# Patient Record
Sex: MALE | Race: WHITE | NOT HISPANIC OR LATINO | Employment: UNEMPLOYED | URBAN - METROPOLITAN AREA
[De-identification: names, ages, dates, MRNs, and addresses within clinical notes are randomized per-mention and may not be internally consistent; named-entity substitution may affect disease eponyms.]

---

## 2017-03-10 ENCOUNTER — HISTORICAL (OUTPATIENT)
Dept: LAB | Facility: HOSPITAL | Age: 22
End: 2017-03-10

## 2017-03-14 ENCOUNTER — HOSPITAL ENCOUNTER (EMERGENCY)
Facility: HOSPITAL | Age: 22
Discharge: HOME | End: 2017-03-14
Attending: EMERGENCY MEDICINE
Payer: MEDICAID

## 2017-03-14 VITALS
WEIGHT: 160 LBS | SYSTOLIC BLOOD PRESSURE: 119 MMHG | OXYGEN SATURATION: 95 % | DIASTOLIC BLOOD PRESSURE: 62 MMHG | HEIGHT: 62 IN | RESPIRATION RATE: 20 BRPM | TEMPERATURE: 98 F | HEART RATE: 82 BPM | BODY MASS INDEX: 29.44 KG/M2

## 2017-03-14 DIAGNOSIS — F19.10 SUBSTANCE ABUSE: Primary | ICD-10-CM

## 2017-03-14 LAB
ALBUMIN SERPL BCP-MCNC: 4 G/DL
ALP SERPL-CCNC: 56 U/L
ALT SERPL W/O P-5'-P-CCNC: 9 U/L
AMPHET+METHAMPHET UR QL: NEGATIVE
ANION GAP SERPL CALC-SCNC: 9 MMOL/L
APAP SERPL-MCNC: 3 UG/ML
AST SERPL-CCNC: 17 U/L
BARBITURATES UR QL SCN>200 NG/ML: NEGATIVE
BASOPHILS # BLD AUTO: 0.01 K/UL
BASOPHILS NFR BLD: 0.2 %
BENZODIAZ UR QL SCN>200 NG/ML: NORMAL
BILIRUB SERPL-MCNC: 0.3 MG/DL
BILIRUB UR QL STRIP: NEGATIVE
BUN SERPL-MCNC: 10 MG/DL
BZE UR QL SCN: NEGATIVE
CALCIUM SERPL-MCNC: 9.2 MG/DL
CANNABINOIDS UR QL SCN: NORMAL
CHLORIDE SERPL-SCNC: 105 MMOL/L
CLARITY UR REFRACT.AUTO: CLEAR
CO2 SERPL-SCNC: 27 MMOL/L
COLOR UR AUTO: YELLOW
CREAT SERPL-MCNC: 1 MG/DL
CREAT UR-MCNC: 125 MG/DL
DIFFERENTIAL METHOD: ABNORMAL
EOSINOPHIL # BLD AUTO: 0.1 K/UL
EOSINOPHIL NFR BLD: 1.5 %
ERYTHROCYTE [DISTWIDTH] IN BLOOD BY AUTOMATED COUNT: 14.2 %
EST. GFR  (AFRICAN AMERICAN): >60 ML/MIN/1.73 M^2
EST. GFR  (NON AFRICAN AMERICAN): >60 ML/MIN/1.73 M^2
ETHANOL SERPL-MCNC: <10 MG/DL
GLUCOSE SERPL-MCNC: 99 MG/DL
GLUCOSE UR QL STRIP: NEGATIVE
HCT VFR BLD AUTO: 38.9 %
HGB BLD-MCNC: 13.1 G/DL
HGB UR QL STRIP: NEGATIVE
KETONES UR QL STRIP: NEGATIVE
LEUKOCYTE ESTERASE UR QL STRIP: NEGATIVE
LYMPHOCYTES # BLD AUTO: 2.2 K/UL
LYMPHOCYTES NFR BLD: 38.2 %
MCH RBC QN AUTO: 32 PG
MCHC RBC AUTO-ENTMCNC: 33.7 %
MCV RBC AUTO: 95 FL
METHADONE UR QL SCN>300 NG/ML: NEGATIVE
MONOCYTES # BLD AUTO: 0.4 K/UL
MONOCYTES NFR BLD: 6.8 %
NEUTROPHILS # BLD AUTO: 3.1 K/UL
NEUTROPHILS NFR BLD: 53.1 %
NITRITE UR QL STRIP: NEGATIVE
OPIATES UR QL SCN: NORMAL
PCP UR QL SCN>25 NG/ML: NEGATIVE
PH UR STRIP: 5 [PH] (ref 5–8)
PLATELET # BLD AUTO: 168 K/UL
PMV BLD AUTO: 10.9 FL
POTASSIUM SERPL-SCNC: 4 MMOL/L
PROT SERPL-MCNC: 6.4 G/DL
PROT UR QL STRIP: NEGATIVE
RBC # BLD AUTO: 4.09 M/UL
SODIUM SERPL-SCNC: 141 MMOL/L
SP GR UR STRIP: 1.01 (ref 1–1.03)
TOXICOLOGY INFORMATION: NORMAL
TSH SERPL DL<=0.005 MIU/L-ACNC: 0.65 UIU/ML
URN SPEC COLLECT METH UR: NORMAL
UROBILINOGEN UR STRIP-ACNC: NEGATIVE EU/DL
WBC # BLD AUTO: 5.86 K/UL

## 2017-03-14 PROCEDURE — 93005 ELECTROCARDIOGRAM TRACING: CPT

## 2017-03-14 PROCEDURE — 80329 ANALGESICS NON-OPIOID 1 OR 2: CPT

## 2017-03-14 PROCEDURE — 93010 ELECTROCARDIOGRAM REPORT: CPT | Mod: ,,, | Performed by: INTERNAL MEDICINE

## 2017-03-14 PROCEDURE — 84443 ASSAY THYROID STIM HORMONE: CPT

## 2017-03-14 PROCEDURE — 85025 COMPLETE CBC W/AUTO DIFF WBC: CPT

## 2017-03-14 PROCEDURE — 81003 URINALYSIS AUTO W/O SCOPE: CPT

## 2017-03-14 PROCEDURE — 25000003 PHARM REV CODE 250: Performed by: STUDENT IN AN ORGANIZED HEALTH CARE EDUCATION/TRAINING PROGRAM

## 2017-03-14 PROCEDURE — 99284 EMERGENCY DEPT VISIT MOD MDM: CPT | Mod: 25

## 2017-03-14 PROCEDURE — 99285 EMERGENCY DEPT VISIT HI MDM: CPT | Mod: ,,, | Performed by: EMERGENCY MEDICINE

## 2017-03-14 PROCEDURE — 82570 ASSAY OF URINE CREATININE: CPT

## 2017-03-14 PROCEDURE — 80320 DRUG SCREEN QUANTALCOHOLS: CPT

## 2017-03-14 PROCEDURE — 80053 COMPREHEN METABOLIC PANEL: CPT

## 2017-03-14 RX ORDER — ONDANSETRON 8 MG/1
8 TABLET, ORALLY DISINTEGRATING ORAL
Status: COMPLETED | OUTPATIENT
Start: 2017-03-14 | End: 2017-03-14

## 2017-03-14 RX ADMIN — ONDANSETRON 8 MG: 8 TABLET, ORALLY DISINTEGRATING ORAL at 09:03

## 2017-03-14 NOTE — ED PROVIDER NOTES
"Encounter Date: 3/14/2017    SCRIBE #1 NOTE: I, Nilo Harrington, am scribing for, and in the presence of,  Dr. Talley. I have scribed the following portions of the note -       History     Chief Complaint   Patient presents with    Psychiatric Evaluation     "self medicating with benzos and alcohol". wants to "voluntarily commit himself". thoughts of suicide     Review of patient's allergies indicates:  No Known Allergies  HPI Comments: Mr. Batres is a 22 yo male w/ PMH significant for depression, bipolar, ADHD, and SI, presenting with complaints of feeling sick this AM and withdrawal.  EMR notes pt CC: "self med with benzos and alcohol, SI, wants to voluntarily commit himself."    On intern exam, pt is a poor historian.  Pt reporting no SIHI, denies plan to harm himself or others.  Reports that "his girl" brought him in this AM.  Pt states that he will go through withdrawal soon, unclear from what medications/drugs.  Pt endorses alcohol use, states he drinks long islands, last drink was sometime last night.  Unable to specify quantity or exact time.  States he took 10 norcos this AM, because he was "feeling sick," which he described as associated with n/v.  Pt states he recently injected heroin, endorses marijuana use, unable to specify most recent time/quantity.      States he has been through withdrawal in the past, unclear last time, states that he had seizures at that time.    Pt has multiple horizontal incisions that are healing on R forearm.  Pt unable to specify when most recent incision was made.    Pt states it has been "years" since he took any of his prescribed medications.    Pt states he has constipation, does not know when last BM was but states that there was blood present in stool.  Denies any recent falls or head trauma.    Pt most recently evaluated in ED on 11/6/16.  At that time, pt presented with SI with plan and opiate abuse.  Pt was PEC'd and discharged to in psych.  Pt came to ED on " "1/13/17, but left without being seen.    The history is provided by the patient and medical records.     Past Medical History:   Diagnosis Date    ADHD (attention deficit hyperactivity disorder)     Anxiety     Anxiety     Benzodiazepine abuse     Bipolar disorder     Depression     Panic attack     Serotonin syndrome     Substance abuse      No past surgical history on file.  No family history on file.  Social History   Substance Use Topics    Smoking status: Current Every Day Smoker     Types: Pipe    Smokeless tobacco: Never Used    Alcohol use Yes     Review of Systems   Constitutional: Negative for chills and fever.   Respiratory: Negative for shortness of breath.    Cardiovascular: Negative for chest pain.   Gastrointestinal: Positive for blood in stool, constipation, nausea and vomiting. Negative for abdominal pain.   Genitourinary: Negative for hematuria.   Psychiatric/Behavioral: Positive for self-injury. Negative for hallucinations and suicidal ideas.       Physical Exam   Initial Vitals   BP Pulse Resp Temp SpO2   03/14/17 1627 03/14/17 1627 03/14/17 1627 03/14/17 1627 03/14/17 1627   119/62 82 20 98.2 °F (36.8 °C) 95 %     Physical Exam    Constitutional: He appears well-developed and well-nourished. No distress.   HENT:   Head: Normocephalic and atraumatic.   Eyes: EOM are normal. Pupils are equal, round, and reactive to light.   Cardiovascular: Normal rate, regular rhythm and normal heart sounds. Exam reveals no friction rub.    No murmur heard.  Pulmonary/Chest: Breath sounds normal. No respiratory distress. He has no wheezes.   Abdominal: Soft. Bowel sounds are normal. He exhibits mass. There is no tenderness. There is no rebound and no guarding.   Musculoskeletal: He exhibits no edema.   Psychiatric: He is slowed. He expresses no homicidal and no suicidal ideation. He expresses no suicidal plans and no homicidal plans.   "Sad, sad, sad" mood  Flat affect  Bradyphrenic  Denies SIHI  AO to " "person/place/ month and year, but not oriented to day.         ED Course   Procedures  Labs Reviewed   CBC W/ AUTO DIFFERENTIAL - Abnormal; Notable for the following:        Result Value    RBC 4.09 (*)     Hemoglobin 13.1 (*)     Hematocrit 38.9 (*)     MCH 32.0 (*)     All other components within normal limits   COMPREHENSIVE METABOLIC PANEL - Abnormal; Notable for the following:     ALT 9 (*)     All other components within normal limits   ACETAMINOPHEN LEVEL - Abnormal; Notable for the following:     Acetaminophen (Tylenol), Serum 3.0 (*)     All other components within normal limits   TSH   URINALYSIS   DRUG SCREEN PANEL, URINE EMERGENCY   ALCOHOL,MEDICAL (ETHANOL)     EKG Readings: (Independently Interpreted)   Sinus evans.  QTc = 397.  No ST elevation/depression          Medical Decision Making:   History:   Old Medical Records: I decided to obtain old medical records.  Initial Assessment:   Mr. Batres is a 22 yo male w/ PMH significant for depression, bipolar, ADHD, and SI, presenting with complaints of feeling sick this AM and withdrawal.  EMR notes pt CC: "self med with benzos and alcohol, SI, wants to voluntarily commit himself."  Differential Diagnosis:   Depression, drug overdose/medication mismanagement, alcohol/benzo withdrawal, opiate withdrawal  Independently Interpreted Test(s):   I have ordered and independently interpreted EKG Reading(s) - see prior notes  Clinical Tests:   Lab Tests: Ordered and Reviewed       <> Summary of Lab: Alcohol <10  UDS + for benzo, opiate, amphetamines, THC  Medical Tests: Ordered and Reviewed  ED Management:  Psych consult, recommend discharge with resources for detox centers       APC / Resident Notes:   9:20 PM  N/v x1.  Discussed with pt, agreeable to discharge with resource sheet for outpt detox.  Discussed with Dr. Talley, agreeable to discharge now.    Leonardo Fonseca MD         Scribe Attestation:   Scribe #1: I performed the above scribed service and the " documentation accurately describes the services I performed. I attest to the accuracy of the note.    Attending Attestation:   Physician Attestation Statement for Resident:  As the supervising MD   Physician Attestation Statement: I have personally seen and examined this patient.   I agree with the above history. -: 20 yo with hx of deperssion, bipolar disorder, suicidal/homocidal ideation. Pt was last PECd in November 2016. Today he is about to go through withdrawal. Pt reports self-medication with benzodiazapenes and ETOH. Pt wants to voluntarily admit himself to hospital. Of note, pt endorses ETOH use and consuming hydrocodone last night as well as using heroine, marijuana this morning. Pt is not on lithium pills.   As the supervising MD I agree with the above PE.   -: Pt with flat affect. He denies suicidal or homicidal ideation on exam.    As the supervising MD I agree with the above treatment, course, plan, and disposition.   -: Will consult psych for evaluation.          Physician Attestation for Scribe:  Physician Attestation Statement for Scribe #1: I, Dr. Talley, reviewed documentation, as scribed by Nilo Harrington in my presence, and it is both accurate and complete.         Attending ED Notes:   Patient evaluated by psychiatry do not feel that there is any reason for this patient to a list of supportive groups given if the patient was to follow drugs the patient is discharged          ED Course     Clinical Impression:   The encounter diagnosis was Substance abuse.    Disposition:   Disposition: Discharged  Condition: Marcela Fonseca MD  Resident  03/16/17 0138       Renaldo Savage MD  03/24/17 9431

## 2017-03-14 NOTE — ED AVS SNAPSHOT
[facility]  [address] [address]               Robert Batres   3/14/2017  4:29 PM   ED    Description:  Male : 1995   Department:  [facility]           Your Care was Coordinated By:     Provider Role From To    Renaldo Savage MD Attending Provider [phone] --    Leonardo Fonseca MD Resident 17 [phone] --      Reason for Visit     Psychiatric Evaluation           Diagnoses this Visit        Comments    Substance abuse    -  Primary       ED Disposition     ED Disposition Condition Comment    Discharge  Mr. Batres should be discharged           To Do List           [facility]     [facility] Care [facility] -  Assistance  Registered nurses in the [facility] provide clinical advisement, health education, appointment booking, and other advisory services.  Call for this free service at [phone].             Medications           Message regarding Medications     Verify the changes and/or additions to your medication regime listed below are the same as discussed with your clinician today.  If any of these changes or additions are incorrect, please notify your healthcare provider.        These medications were administered today        Dose Freq    ondansetron disintegrating tablet 8 mg 8 mg ED 1 Time    Sig: Take 1 tablet (8 mg total) by mouth ED 1 Time.    Class: Normal    Route: Oral           Verify that the below list of medications is an accurate representation of the medications you are currently taking.  If none reported, the list may be blank. If incorrect, please contact your healthcare provider. Carry this list with you in case of emergency.           Current Medications     diazepam (VALIUM) 5 MG tablet Take 5 mg by mouth every 6 (six) hours as needed for Anxiety.    divalproex (DEPAKOTE) 500 MG TbEC Take 1 tablet (500 mg total) by mouth every 12 (twelve) hours.     "divalproex (DEPAKOTE) 500 MG TbEC Take 1 tablet (500 mg total) by mouth every 12 (twelve) hours.    gabapentin (NEURONTIN) 400 MG capsule Take 1 capsule (400 mg total) by mouth every 6 (six) hours.    gabapentin (NEURONTIN) 400 MG capsule Take 1 capsule (400 mg total) by mouth every 6 (six) hours.    mirtazapine (REMERON) 15 MG tablet Take 15 mg by mouth every evening.    ondansetron disintegrating tablet 8 mg Take 1 tablet (8 mg total) by mouth ED 1 Time.           Clinical Reference Information           Your Vitals Were     BP Pulse Temp Resp Height Weight    119/62 (BP Location: Right arm, Patient Position: Sitting) 82 98.2 °F (36.8 °C) (Oral) 20 5' 1.5" (1.562 m) 72.6 kg (160 lb)    SpO2 BMI             95% 29.74 kg/m2         Allergies as of 3/14/2017     No Known Allergies      Immunizations Administered on Date of Encounter - 3/14/2017     None      ED Micro, Lab, POCT     Start Ordered       Status Ordering Provider    03/14/17 1732 03/14/17 1732  CBC auto differential  STAT      Final result     03/14/17 1732 03/14/17 1732  Comprehensive metabolic panel  STAT      Final result     03/14/17 1732 03/14/17 1732  TSH  STAT      Final result     03/14/17 1732 03/14/17 1732  Urinalysis - clean catch  STAT      Final result     03/14/17 1732 03/14/17 1732  Drug screen panel, emergency  STAT      Final result     03/14/17 1732 03/14/17 1732  Ethanol  STAT      Final result     03/14/17 1732 03/14/17 1732  Acetaminophen level  STAT      Final result       ED Imaging Orders     None        Discharge Instructions         Alcohol Addiction  Are you addicted to alcohol?    You may be addicted to alcohol if your drinking harms yourself or others or leads to other problems with your daily life.  The medical term for this is alcohol use disorder. Your health care provider may diagnose you with this disorder if you have at least two of the following problems within the span of a year:  · You drink alcohol in larger amounts " or for a longer period than you intended.  · You frequently want to cut down or control alcohol use, or have frequently failed efforts to do so.  · You spend a lot of time getting alcohol, using alcohol, or recovering from alcohol use.  · You crave or have a strong desire or urge to drink alcohol.  · Ongoing alcohol use makes it hard for you to be responsible at work, school, or home.  · You continue to use alcohol even though you have had problems in relationships or social settings because of it.  · You give up or miss important social, work, or recreational activities because of alcohol use.  · You drink alcohol in situations in which it is physically unsafe, such as driving while intoxicated.  · You continue to drink alcohol despite knowing it has caused physical or emotional problems.  · You need more and more alcohol to get the same effects.  · You have withdrawal symptoms or use alcohol to avoid withdrawal symptoms.  Date Last Reviewed: 11/11/2014  © 8955-8562 [employer]. [address] [place] [address]. All rights reserved. This information is not intended as a substitute for professional medical care. Always follow your healthcare professional's instructions.          Discharge References/Attachments     ADDICTION: GETTING HELP (ENGLISH)    ADDICTION: YOUR TREATMENT OPTIONS (ENGLISH)    DRUG ABUSE (ENGLISH)      [facility] Sign-Up     Activating your [facility] account is as easy as 1-2-3!     1) Visit [email or web address], select Sign Up Now, enter this activation code and your date of birth, then select Next.  [email or web address]  Expires: 4/28/2017  9:17 PM      2) Create a username and password to use when you visit [facility] in the future and select a security question in case you lose your password and select Next.    3) Enter your e-mail address and click Sign Up!    Additional Information  If you have questions, please e-mail [email or web address] or call [phone] to talk to our  [facility] staff. Remember, [facility] is NOT to be used for urgent needs. For medical emergencies, dial 911.         Smoking Cessation     If you would like to quit smoking:   You may be eligible for free services if you are a [place] resident and started smoking cigarettes before September 1, 1988.  Call the Smoking Cessation Trust (SCT) toll free at (082) 976-4016 or (624) 999-3217.   Call 1-800-QUIT-NOW if you do not meet the above criteria.             [facility] complies with applicable Federal civil rights laws and does not discriminate on the basis of race, color, national origin, age, disability, or sex.        Language Assistance Services     ATTENTION: Language assistance services are available, free of charge. Please call [phone].      ATENCIÓN: Si habla español, tiene a schwab disposición servicios gratuitos de asistencia lingüística. Llame al [phone].     CHÚ Ý: N?u b?n nói Ti?ng Vi?t, có các d?ch v? h? tr? ngôn ng? mi?n phí dành cho b?n. G?i s? [phone].

## 2017-03-15 NOTE — ED NOTES
Physician at bedside. Pt requesting nicotine patch, and nausea medication due to active vomiting. Pt requesting to leave immediately.

## 2017-03-15 NOTE — ED NOTES
Pt given discharge papers and refused vitals. States that he is upset that his girlfriend is not answering the phone and is unable to get his wallet from her. Pt walked to [facility] and escorted out of ED. Pt was cursing in hallway and instructed to leave ED entrance area by security.

## 2017-03-15 NOTE — DISCHARGE INSTRUCTIONS
Alcohol Addiction  Are you addicted to alcohol?    You may be addicted to alcohol if your drinking harms yourself or others or leads to other problems with your daily life.  The medical term for this is alcohol use disorder. Your health care provider may diagnose you with this disorder if you have at least two of the following problems within the span of a year:  · You drink alcohol in larger amounts or for a longer period than you intended.  · You frequently want to cut down or control alcohol use, or have frequently failed efforts to do so.  · You spend a lot of time getting alcohol, using alcohol, or recovering from alcohol use.  · You crave or have a strong desire or urge to drink alcohol.  · Ongoing alcohol use makes it hard for you to be responsible at work, school, or home.  · You continue to use alcohol even though you have had problems in relationships or social settings because of it.  · You give up or miss important social, work, or recreational activities because of alcohol use.  · You drink alcohol in situations in which it is physically unsafe, such as driving while intoxicated.  · You continue to drink alcohol despite knowing it has caused physical or emotional problems.  · You need more and more alcohol to get the same effects.  · You have withdrawal symptoms or use alcohol to avoid withdrawal symptoms.  Date Last Reviewed: 11/11/2014  © 4294-6204 [employer]. [address] [place] [address], [place] [address]. All rights reserved. This information is not intended as a substitute for professional medical care. Always follow your healthcare professional's instructions.
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
## 2017-04-21 ENCOUNTER — HOSPITAL ENCOUNTER (INPATIENT)
Facility: HOSPITAL | Age: 22
LOS: 5 days | Discharge: HOME | DRG: 897 | End: 2017-04-26
Attending: PSYCHIATRY & NEUROLOGY | Admitting: PSYCHIATRY & NEUROLOGY
Payer: MEDICAID

## 2017-04-21 DIAGNOSIS — F11.23 OPIOID DEPENDENCE WITH WITHDRAWAL: ICD-10-CM

## 2017-04-21 DIAGNOSIS — F19.94 SUBSTANCE INDUCED MOOD DISORDER: ICD-10-CM

## 2017-04-21 DIAGNOSIS — F10.230 ALCOHOL DEPENDENCE WITH UNCOMPLICATED WITHDRAWAL: Chronic | ICD-10-CM

## 2017-04-21 DIAGNOSIS — F19.10 POLYSUBSTANCE ABUSE: ICD-10-CM

## 2017-04-21 PROBLEM — F10.939 ALCOHOL WITHDRAWAL: Status: ACTIVE | Noted: 2017-04-21

## 2017-04-21 PROCEDURE — 25000003 PHARM REV CODE 250: Performed by: PSYCHIATRY & NEUROLOGY

## 2017-04-21 PROCEDURE — 99223 1ST HOSP IP/OBS HIGH 75: CPT | Mod: AF,S$PBB,, | Performed by: PSYCHIATRY & NEUROLOGY

## 2017-04-21 PROCEDURE — 25000003 PHARM REV CODE 250: Performed by: STUDENT IN AN ORGANIZED HEALTH CARE EDUCATION/TRAINING PROGRAM

## 2017-04-21 PROCEDURE — 12400001 HC PSYCH SEMI-PRIVATE ROOM

## 2017-04-21 RX ORDER — MIRTAZAPINE 30 MG/1
30 TABLET, FILM COATED ORAL NIGHTLY
Status: DISCONTINUED | OUTPATIENT
Start: 2017-04-21 | End: 2017-04-26 | Stop reason: HOSPADM

## 2017-04-21 RX ORDER — CLONIDINE HYDROCHLORIDE 0.1 MG/1
0.1 TABLET ORAL EVERY 6 HOURS PRN
Status: DISCONTINUED | OUTPATIENT
Start: 2017-04-21 | End: 2017-04-26 | Stop reason: HOSPADM

## 2017-04-21 RX ORDER — IBUPROFEN 200 MG
1 TABLET ORAL DAILY
Status: DISCONTINUED | OUTPATIENT
Start: 2017-04-21 | End: 2017-04-26 | Stop reason: HOSPADM

## 2017-04-21 RX ORDER — IBUPROFEN 600 MG/1
600 TABLET ORAL EVERY 6 HOURS PRN
Status: DISCONTINUED | OUTPATIENT
Start: 2017-04-21 | End: 2017-04-26 | Stop reason: HOSPADM

## 2017-04-21 RX ORDER — LORAZEPAM 2 MG/ML
2 INJECTION INTRAMUSCULAR EVERY 4 HOURS PRN
Status: DISCONTINUED | OUTPATIENT
Start: 2017-04-21 | End: 2017-04-22

## 2017-04-21 RX ORDER — DIAZEPAM 5 MG/1
5 TABLET ORAL 3 TIMES DAILY
Status: DISCONTINUED | OUTPATIENT
Start: 2017-04-21 | End: 2017-04-21

## 2017-04-21 RX ORDER — ESCITALOPRAM OXALATE 10 MG/1
10 TABLET ORAL DAILY
Status: DISCONTINUED | OUTPATIENT
Start: 2017-04-21 | End: 2017-04-21

## 2017-04-21 RX ORDER — HYDROXYZINE PAMOATE 50 MG/1
50 CAPSULE ORAL EVERY 6 HOURS PRN
Status: DISCONTINUED | OUTPATIENT
Start: 2017-04-21 | End: 2017-04-26 | Stop reason: HOSPADM

## 2017-04-21 RX ORDER — DIPHENHYDRAMINE HYDROCHLORIDE 50 MG/ML
50 INJECTION INTRAMUSCULAR; INTRAVENOUS EVERY 4 HOURS PRN
Status: DISCONTINUED | OUTPATIENT
Start: 2017-04-21 | End: 2017-04-22

## 2017-04-21 RX ORDER — HALOPERIDOL 5 MG/ML
5 INJECTION INTRAMUSCULAR EVERY 4 HOURS PRN
Status: DISCONTINUED | OUTPATIENT
Start: 2017-04-21 | End: 2017-04-22

## 2017-04-21 RX ORDER — DIAZEPAM 5 MG/1
10 TABLET ORAL 2 TIMES DAILY
Status: DISCONTINUED | OUTPATIENT
Start: 2017-04-21 | End: 2017-04-24

## 2017-04-21 RX ORDER — THIAMINE HCL 100 MG
100 TABLET ORAL DAILY
Status: DISCONTINUED | OUTPATIENT
Start: 2017-04-21 | End: 2017-04-26 | Stop reason: HOSPADM

## 2017-04-21 RX ORDER — HALOPERIDOL 5 MG/1
5 TABLET ORAL EVERY 4 HOURS PRN
Status: DISCONTINUED | OUTPATIENT
Start: 2017-04-21 | End: 2017-04-22

## 2017-04-21 RX ORDER — DICYCLOMINE HYDROCHLORIDE 10 MG/1
10 CAPSULE ORAL 4 TIMES DAILY
Status: DISCONTINUED | OUTPATIENT
Start: 2017-04-21 | End: 2017-04-25

## 2017-04-21 RX ORDER — IBUPROFEN 200 MG
24 TABLET ORAL
Status: DISCONTINUED | OUTPATIENT
Start: 2017-04-21 | End: 2017-04-26 | Stop reason: HOSPADM

## 2017-04-21 RX ORDER — DIPHENHYDRAMINE HCL 50 MG
50 CAPSULE ORAL EVERY 4 HOURS PRN
Status: DISCONTINUED | OUTPATIENT
Start: 2017-04-21 | End: 2017-04-22

## 2017-04-21 RX ORDER — GLUCAGON 1 MG
1 KIT INJECTION
Status: DISCONTINUED | OUTPATIENT
Start: 2017-04-21 | End: 2017-04-26 | Stop reason: HOSPADM

## 2017-04-21 RX ORDER — ONDANSETRON 4 MG/1
4 TABLET, ORALLY DISINTEGRATING ORAL EVERY 8 HOURS PRN
Status: DISCONTINUED | OUTPATIENT
Start: 2017-04-21 | End: 2017-04-22

## 2017-04-21 RX ORDER — IBUPROFEN 200 MG
16 TABLET ORAL
Status: DISCONTINUED | OUTPATIENT
Start: 2017-04-21 | End: 2017-04-26 | Stop reason: HOSPADM

## 2017-04-21 RX ORDER — DICYCLOMINE HYDROCHLORIDE 20 MG/1
20 TABLET ORAL 4 TIMES DAILY PRN
Status: DISCONTINUED | OUTPATIENT
Start: 2017-04-21 | End: 2017-04-25

## 2017-04-21 RX ORDER — ESCITALOPRAM OXALATE 10 MG/1
10 TABLET ORAL DAILY
Status: DISCONTINUED | OUTPATIENT
Start: 2017-04-21 | End: 2017-04-26 | Stop reason: HOSPADM

## 2017-04-21 RX ORDER — LORAZEPAM 1 MG/1
2 TABLET ORAL EVERY 4 HOURS PRN
Status: DISCONTINUED | OUTPATIENT
Start: 2017-04-21 | End: 2017-04-22

## 2017-04-21 RX ORDER — METHOCARBAMOL 500 MG/1
500 TABLET, FILM COATED ORAL 4 TIMES DAILY PRN
Status: DISCONTINUED | OUTPATIENT
Start: 2017-04-21 | End: 2017-04-26 | Stop reason: HOSPADM

## 2017-04-21 RX ORDER — LOPERAMIDE HYDROCHLORIDE 2 MG/1
2 CAPSULE ORAL
Status: DISCONTINUED | OUTPATIENT
Start: 2017-04-21 | End: 2017-04-26 | Stop reason: HOSPADM

## 2017-04-21 RX ADMIN — THIAMINE HCL TAB 100 MG 100 MG: 100 TAB at 12:04

## 2017-04-21 RX ADMIN — METHOCARBAMOL 500 MG: 500 TABLET ORAL at 08:04

## 2017-04-21 RX ADMIN — NICOTINE 1 PATCH: 14 PATCH, EXTENDED RELEASE TRANSDERMAL at 09:04

## 2017-04-21 RX ADMIN — ONDANSETRON 4 MG: 4 TABLET, ORALLY DISINTEGRATING ORAL at 04:04

## 2017-04-21 RX ADMIN — DICYCLOMINE HYDROCHLORIDE 10 MG: 10 CAPSULE ORAL at 12:04

## 2017-04-21 RX ADMIN — DICYCLOMINE HYDROCHLORIDE 20 MG: 20 TABLET ORAL at 08:04

## 2017-04-21 RX ADMIN — DIAZEPAM 10 MG: 5 TABLET ORAL at 08:04

## 2017-04-21 RX ADMIN — IBUPROFEN 600 MG: 600 TABLET, FILM COATED ORAL at 05:04

## 2017-04-21 RX ADMIN — CLONIDINE HYDROCHLORIDE 0.1 MG: 0.1 TABLET ORAL at 08:04

## 2017-04-21 RX ADMIN — DICYCLOMINE HYDROCHLORIDE 10 MG: 10 CAPSULE ORAL at 11:04

## 2017-04-21 RX ADMIN — THERA TABS 1 TABLET: TAB at 08:04

## 2017-04-21 RX ADMIN — IBUPROFEN 600 MG: 600 TABLET, FILM COATED ORAL at 08:04

## 2017-04-21 RX ADMIN — Medication 1 TABLET: at 12:04

## 2017-04-21 RX ADMIN — DIAZEPAM 10 MG: 5 TABLET ORAL at 12:04

## 2017-04-21 RX ADMIN — MIRTAZAPINE 30 MG: 30 TABLET, FILM COATED ORAL at 08:04

## 2017-04-21 RX ADMIN — DICYCLOMINE HYDROCHLORIDE 10 MG: 10 CAPSULE ORAL at 05:04

## 2017-04-21 RX ADMIN — ESCITALOPRAM OXALATE 10 MG: 10 TABLET ORAL at 12:04

## 2017-04-21 RX ADMIN — CLONIDINE HYDROCHLORIDE 0.1 MG: 0.1 TABLET ORAL at 11:04

## 2017-04-21 NOTE — NURSING
Pt compliant with signing paperwork. Pt oriented to unit, requests shower. Pt searched for contraband, new wrist band applied, new scrubs given.

## 2017-04-21 NOTE — PLAN OF CARE
Problem: Patient Care Overview (Adult)  Goal: Plan of Care Review  Outcome: Ongoing (interventions implemented as appropriate)  Patient isolative and in bed all day. Complained of abdominal cramping, body aches and sweating. PRN medications given. Denies SI/Hi/AVH. Did not attend unit activities. Plan of care reviewed and verbalized understanding.

## 2017-04-21 NOTE — PROGRESS NOTES
"Pt conversing with staff after dinner, discussing his challenges with addiction. Pt said that he lost a friend to overdose and continued that "If I don't beat this addiction, I'm going to kill myself." Pt said his plan would be to obtain a large syringe and inject himself with "500 micrograms of heroine and fentanyl." Pt continued that it would be fairly easy for him to obtain. Staff actively listened to patient in a nonjudgmental manner, while motivating towards rehab. Pt stated that he was amenable to a long term in-patient rehab after he is done going through withdrawal symptoms.        "
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
## 2017-04-21 NOTE — PROGRESS NOTES
04/21/17 0700   Patient/Family Goals   Goal Formulation With patient   Time For Goal Achievement 3 days   Goal 1 attend all activities, relaxation, education and relapse prevention groups   Assessment   Leisure Interest Reading;Exercise;Listen to Music;Walk;Enjoy Nature;Sit Outside;Movies;Enjoy Family/Friends;Sports   Leisure Barriers Poor Impulse Control;Attitude;Drug Use;Social Support   Treatment Focus To Improve Mood;To Improve Coping Skills;Increase Problems Solving and Decision Making Skills;To Educate and Increase Awareness of Sober Free Activities

## 2017-04-21 NOTE — PROGRESS NOTES
04/21/17 0900   [facility] Group Therapy   Group Name Community Reintegration   Specific Interventions Current Events   Participation Level Active   Participation Quality Cooperative;Withdrawn   Insight/Motivation Limited   Affect/Mood Display Blunted;Flat   Cognition Oriented   Patient refused most groups; c/o feeling lethargic/ tired due to recent drug usage; Offered support and encouraged patient to make effort to attend groups

## 2017-04-21 NOTE — IP AVS SNAPSHOT
[facility]  [address]  [facility] [address]  Phone: [phone]           Patient Discharge Instructions   Our goal is to set you up for success. This packet includes information on your condition, medications, and your home care.  It will help you care for yourself to prevent having to return to the hospital.     Please ask your nurse if you have any questions.      There are many details to remember when preparing to leave the hospital. Here is what you will need to do:    1. Take your medicine. If you are prescribed medications, review your Medication List on the following pages. You may have new medications to  at the pharmacy and others that you'll need to stop taking. Review the instructions for how and when to take your medications. Talk with your doctor or nurses if you are unsure of what to do.     2. Go to your follow-up appointments. Specific follow-up information is listed in the following pages. Your may be contacted by a nurse or clinical provider about future appointments. Be sure we have all of the phone numbers to reach you. Please contact your provider's office if you are unable to make an appointment.     3. Watch for warning signs. Your doctor or nurse will give you detailed warning signs to watch for and when to call for assistance. These instructions may also include educational information about your condition. If you experience any of warning signs to your health, call your doctor.           [facility]  Unless otherwise directed by your provider, please   contact [facility], our nurse care line   that is available for 24/7 assistance.     [phone] (toll-free)     Registered nurses in the [facility]   provide: appointment scheduling, clinical advisement, health education, and other advisory services.                  ** Verify the list of medication(s) below is accurate and up to date. Carry this with you in case of  emergency. If your medications have changed, please notify your healthcare provider.             Medication List      START taking these medications        Additional Info                      escitalopram oxalate 10 MG tablet   Commonly known as:  LEXAPRO   Quantity:  30 tablet   Refills:  0   Dose:  10 mg   Indications:  Depression associated with Bipolar Disorder    Last time this was given:  10 mg on 4/26/2017  8:07 AM   Instructions:  Take 1 tablet (10 mg total) by mouth once daily.     Begin Date    AM    Noon    PM    Bedtime         CHANGE how you take these medications        Additional Info                      gabapentin 300 MG capsule   Commonly known as:  NEURONTIN   Quantity:  90 capsule   Refills:  0   Dose:  600 mg   Indications:  Restless Legs Syndrome   What changed:    - medication strength  - how much to take  - when to take this  - Another medication with the same name was removed. Continue taking this medication, and follow the directions you see here.    Last time this was given:  600 mg on 4/26/2017  6:01 AM   Instructions:  Take 2 capsules (600 mg total) by mouth 3 (three) times daily.     Begin Date    AM    Noon    PM    Bedtime       mirtazapine 30 MG tablet   Commonly known as:  REMERON   Quantity:  30 tablet   Refills:  0   Dose:  30 mg   Indications:  major depressive disorder   What changed:    - medication strength  - how much to take    Last time this was given:  30 mg on 4/25/2017  8:19 PM   Instructions:  Take 1 tablet (30 mg total) by mouth every evening.     Begin Date    AM    Noon    PM    Bedtime         STOP taking these medications     diazePAM 5 MG tablet   Commonly known as:  VALIUM       divalproex 500 MG Tbec   Commonly known as:  DEPAKOTE   You were prescribed two or more home medications with a similar name. You should completely stop taking this medication.            Where to Get Your Medications      You can get these medications from any pharmacy     Bring a paper  "prescription for each of these medications     escitalopram oxalate 10 MG tablet    gabapentin 300 MG capsule    mirtazapine 30 MG tablet                  Please bring to all follow up appointments:    1. A copy of your discharge instructions.  2. All medicines you are currently taking in their original bottles.  3. Identification and insurance card.    Please arrive 15 minutes ahead of scheduled appointment time.    Please call 24 hours in advance if you must reschedule your appointment and/or time.        Follow-up Information     Follow up with [facility] .    Why:  REPORT TO OFFICE FOR ADMISSION AND ASSESSMENT.ON THURSDAY 04/27/17.     Contact information:    [address] [place], [place]   PHONE: [phone]        Discharge Instructions     Future Orders    Activity as tolerated     Call MD for:  persistent nausea and vomiting or diarrhea     Call MD for:  temperature >100.4     Diet general     Questions:    Total calories:      Fat restriction, if any:      Protein restriction, if any:      Na restriction, if any:      Fluid restriction:      Additional restrictions:          Primary Diagnosis     Your primary diagnosis was:  Drug Withdrawal      Admission Information     Date & Time Provider Department [facility]    4/21/2017  5:46 AM Mariana Vargas MD [facility] [phone]       Admisson Diagnosis: Substance induced mood disorder      Care Providers     Provider Role Specialty Primary office phone    Mariana Vargas MD Attending Provider Psychiatry [phone]    Gilmar Davidson Jr., MD Consulting Physician  Psychiatry  [phone]      Your Vitals Were     BP Pulse Temp Resp Height Weight    140/90 (BP Location: Left arm, Patient Position: Sitting, BP Method: Automatic) 70 99.6 °F (37.6 °C) (Oral) 16 6' 1" (1.854 m) 71.7 kg (158 lb 1.1 oz)    BMI                20.85 kg/m2          Recent Lab Values     No lab values to display.      Blood Glucose and Lipid " Panel Labs        5/8/2016 4/22/2017                        3:45 PM  5:58 AM          Glucose 78 -          Cholesterol - 140          Triglycerides - 92          HDL Cholesterol - 46          LDL Cholesterol - 75.6          HDL/Cholesterol Ratio - 32.9          Total Cholesterol/HDL Ratio - 3.0          Non-HDL Cholesterol - 94          Comment for Cholesterol at  5:58 AM on 4/22/2017:  The National Cholesterol Education Program (NCEP) has set the  following guidelines (reference ranges) for Cholesterol:  Optimal.....................<200 mg/dL  Borderline High.............200-239 mg/dL  High........................> or = 240 mg/dL      Comment for Triglycerides at  5:58 AM on 4/22/2017:  The National Cholesterol Education Program (NCEP) has set the  following guidelines (reference values) for triglycerides:  Normal......................<150 mg/dL  Borderline High.............150-199 mg/dL  High........................200-499 mg/dL      Comment for HDL Cholesterol at  5:58 AM on 4/22/2017:  The National Cholesterol Education Program (NCEP) has set the  following guidelines (reference values) for HDL Cholesterol:  Low...............<40 mg/dL  Optimal...........>60 mg/dL      Comment for LDL Cholesterol at  5:58 AM on 4/22/2017:  The National Cholesterol Education Program (NCEP) has set the  following guidelines (reference values) for LDL Cholesterol:  Optimal.......................<130 mg/dL  Borderline High...............130-159 mg/dL  High..........................160-189 mg/dL  Very High.....................>190 mg/dL      Comment for Non-HDL Cholesterol at  5:58 AM on 4/22/2017:  Risk category and Non-HDL cholesterol goals:  Coronary heart disease (CHD)or equivalent (10-year risk of CHD >20%):  Non-HDL cholesterol goal     <130 mg/dL  Two or more CHD risk factors and 10-year risk of CHD <= 20%:  Non-HDL cholesterol goal     <160 mg/dL  0 to 1 CHD risk factor:  Non-HDL cholesterol goal     <190 mg/dL         Allergies as of 4/26/2017     No Known Allergies      Advance Directives     An advance directive is a document which, in the event you are no longer able to make decisions for yourself, tells your healthcare team what kind of treatment you do or do not want to receive, or who you would like to make those decisions for you.  If you do not currently have an advance directive, [facility] encourages you to create one.  For more information call:  [phone]), [phone] ([phone]),  or log on to [email or web address].        Advance Directive Status          Most Recent Value    Advance Directive Status  Patient does not have Advance Directive, requests information.      Smoking Cessation     If you would like to quit smoking:   You may be eligible for free services if you are a [place] resident and started smoking cigarettes before September 1, 1988.  Call the Smoking Cessation Trust ([facility]) toll free at [phone] or [phone].   Call 2-666-QUIT-NOW if you do not meet the above criteria.   Contact us via email: [email or web address]   View our website for more information: [email or web address]        Language Assistance Services     ATTENTION: Language assistance services are available, free of charge. Please call [phone].      ATENCIÓN: Si habla español, tiene a schwab disposición servicios gratuitos de asistencia lingüística. Llame al [phone].     CHÚ Ý: N?u b?n nói Ti?ng Vi?t, có các d?ch v? h? tr? ngôn ng? mi?n phí dành cho b?n. G?i s? [phone].        National Suicide Prevention Lifeline     If you or someone you know is thinking about suicide, call the National Suicide Prevention Lifeline.  National Suicide Hotline: 5-482-162-TALK (9509)  The lifeline is free, confidential and always available.  Help a loved one, a friend or yourself.  www.suicideprevenetionlifeline.org          [employer] Sign-Up     Activating your [facility] account is as easy as  1-2-3!     1) Visit [email or web address], select Sign Up Now, enter this activation code and your date of birth, then select Next.  [email or web address]  Expires: 4/28/2017  9:17 PM      2) Create a username and password to use when you visit [facility] in the future and select a security question in case you lose your password and select Next.    3) Enter your e-mail address and click Sign Up!    Additional Information  If you have questions, please e-mail [email or web address] or call [phone] to talk to our [email or web address] staff. Remember, [facility] is NOT to be used for urgent needs. For medical emergencies, dial 911.          [facility] complies with applicable Federal civil rights laws and does not discriminate on the basis of race, color, national origin, age, disability, or sex.

## 2017-04-21 NOTE — PT/OT/SLP PROGRESS
Occupational Therapy  Pt Update    Robert Batres  MRN: [id number]    Pt did not attend OT group today 2/2 pt asleep and isolated in room during group session. No billable units today.    WARREN Whyte  4/21/2017

## 2017-04-21 NOTE — PROGRESS NOTES
04/21/17 0700   Patient/Family Goals   Goal Formulation With patient   Time For Goal Achievement 3 days   Goal 1 attend all activities, relaxation, education and relapse prevention groups

## 2017-04-21 NOTE — PLAN OF CARE
Problem: Patient Care Overview (Adult)  Goal: Plan of Care Review  Outcome: Ongoing (interventions implemented as appropriate)  Pt admitted calm and cooperative following direction. Took a shower placed in paper scrubs. Oriented to room introduced to staff. Instructed on phone and visitation time signed legal papers. MVC in place contracted for no harm.     Problem: Depression, PHP  Goal: STG-Report improved mood  Outcome: Ongoing (interventions implemented as appropriate)  Calm and cooperative.   Goal: STG-Verbalize alternatives to suicide  Outcome: Ongoing (interventions implemented as appropriate)  Pt admitted to the unit seeking help for drug addiction.

## 2017-04-22 LAB
CHOLEST/HDLC SERPL: 3 {RATIO}
FOLATE SERPL-MCNC: 14.3 NG/ML
HDL/CHOLESTEROL RATIO: 32.9 %
HDLC SERPL-MCNC: 140 MG/DL
HDLC SERPL-MCNC: 46 MG/DL
LDLC SERPL CALC-MCNC: 75.6 MG/DL
NONHDLC SERPL-MCNC: 94 MG/DL
T3 SERPL-MCNC: 78 NG/DL
T4 FREE SERPL-MCNC: 0.92 NG/DL
TRIGL SERPL-MCNC: 92 MG/DL
VIT B12 SERPL-MCNC: 686 PG/ML

## 2017-04-22 PROCEDURE — 84480 ASSAY TRIIODOTHYRONINE (T3): CPT

## 2017-04-22 PROCEDURE — 12400001 HC PSYCH SEMI-PRIVATE ROOM

## 2017-04-22 PROCEDURE — 80061 LIPID PANEL: CPT

## 2017-04-22 PROCEDURE — 99232 SBSQ HOSP IP/OBS MODERATE 35: CPT | Mod: AF,S$PBB,, | Performed by: PSYCHIATRY & NEUROLOGY

## 2017-04-22 PROCEDURE — 36415 COLL VENOUS BLD VENIPUNCTURE: CPT

## 2017-04-22 PROCEDURE — 82607 VITAMIN B-12: CPT

## 2017-04-22 PROCEDURE — 82746 ASSAY OF FOLIC ACID SERUM: CPT

## 2017-04-22 PROCEDURE — 84439 ASSAY OF FREE THYROXINE: CPT

## 2017-04-22 PROCEDURE — 25000003 PHARM REV CODE 250: Performed by: STUDENT IN AN ORGANIZED HEALTH CARE EDUCATION/TRAINING PROGRAM

## 2017-04-22 PROCEDURE — 25000003 PHARM REV CODE 250: Performed by: PSYCHIATRY & NEUROLOGY

## 2017-04-22 RX ORDER — LORAZEPAM 2 MG/ML
2 INJECTION INTRAMUSCULAR EVERY 4 HOURS PRN
Status: DISCONTINUED | OUTPATIENT
Start: 2017-04-22 | End: 2017-04-26 | Stop reason: HOSPADM

## 2017-04-22 RX ORDER — HALOPERIDOL 5 MG/1
5 TABLET ORAL EVERY 4 HOURS PRN
Status: DISCONTINUED | OUTPATIENT
Start: 2017-04-22 | End: 2017-04-26 | Stop reason: HOSPADM

## 2017-04-22 RX ORDER — GABAPENTIN 300 MG/1
300 CAPSULE ORAL 3 TIMES DAILY
Status: DISCONTINUED | OUTPATIENT
Start: 2017-04-22 | End: 2017-04-23

## 2017-04-22 RX ORDER — DIPHENHYDRAMINE HYDROCHLORIDE 50 MG/ML
50 INJECTION INTRAMUSCULAR; INTRAVENOUS EVERY 4 HOURS PRN
Status: DISCONTINUED | OUTPATIENT
Start: 2017-04-22 | End: 2017-04-26 | Stop reason: HOSPADM

## 2017-04-22 RX ORDER — PROMETHAZINE HYDROCHLORIDE 12.5 MG/1
25 TABLET ORAL EVERY 6 HOURS PRN
Status: DISCONTINUED | OUTPATIENT
Start: 2017-04-22 | End: 2017-04-26 | Stop reason: HOSPADM

## 2017-04-22 RX ORDER — DIPHENHYDRAMINE HCL 50 MG
50 CAPSULE ORAL EVERY 4 HOURS PRN
Status: DISCONTINUED | OUTPATIENT
Start: 2017-04-22 | End: 2017-04-26 | Stop reason: HOSPADM

## 2017-04-22 RX ORDER — HALOPERIDOL 5 MG/ML
5 INJECTION INTRAMUSCULAR EVERY 4 HOURS PRN
Status: DISCONTINUED | OUTPATIENT
Start: 2017-04-22 | End: 2017-04-26 | Stop reason: HOSPADM

## 2017-04-22 RX ORDER — LORAZEPAM 1 MG/1
2 TABLET ORAL EVERY 4 HOURS PRN
Status: DISCONTINUED | OUTPATIENT
Start: 2017-04-22 | End: 2017-04-26 | Stop reason: HOSPADM

## 2017-04-22 RX ADMIN — DIAZEPAM 10 MG: 5 TABLET ORAL at 08:04

## 2017-04-22 RX ADMIN — GABAPENTIN 300 MG: 300 CAPSULE ORAL at 10:04

## 2017-04-22 RX ADMIN — METHOCARBAMOL 500 MG: 500 TABLET ORAL at 04:04

## 2017-04-22 RX ADMIN — PROMETHAZINE HYDROCHLORIDE 25 MG: 12.5 TABLET ORAL at 04:04

## 2017-04-22 RX ADMIN — GABAPENTIN 300 MG: 300 CAPSULE ORAL at 01:04

## 2017-04-22 RX ADMIN — DICYCLOMINE HYDROCHLORIDE 10 MG: 10 CAPSULE ORAL at 05:04

## 2017-04-22 RX ADMIN — ONDANSETRON 4 MG: 4 TABLET, ORALLY DISINTEGRATING ORAL at 08:04

## 2017-04-22 RX ADMIN — MIRTAZAPINE 30 MG: 30 TABLET, FILM COATED ORAL at 08:04

## 2017-04-22 RX ADMIN — THERA TABS 1 TABLET: TAB at 08:04

## 2017-04-22 RX ADMIN — ESCITALOPRAM OXALATE 10 MG: 10 TABLET ORAL at 08:04

## 2017-04-22 RX ADMIN — CLONIDINE HYDROCHLORIDE 0.1 MG: 0.1 TABLET ORAL at 01:04

## 2017-04-22 RX ADMIN — METHOCARBAMOL 500 MG: 500 TABLET ORAL at 08:04

## 2017-04-22 RX ADMIN — THIAMINE HCL TAB 100 MG 100 MG: 100 TAB at 08:04

## 2017-04-22 RX ADMIN — HYDROXYZINE PAMOATE 50 MG: 50 CAPSULE ORAL at 04:04

## 2017-04-22 RX ADMIN — LOPERAMIDE HYDROCHLORIDE 2 MG: 2 CAPSULE ORAL at 01:04

## 2017-04-22 RX ADMIN — IBUPROFEN 600 MG: 600 TABLET, FILM COATED ORAL at 08:04

## 2017-04-22 RX ADMIN — GABAPENTIN 300 MG: 300 CAPSULE ORAL at 09:04

## 2017-04-22 RX ADMIN — DICYCLOMINE HYDROCHLORIDE 10 MG: 10 CAPSULE ORAL at 12:04

## 2017-04-22 RX ADMIN — Medication 1 TABLET: at 08:04

## 2017-04-22 NOTE — NURSING
Initial encounter with pt. Pt reports ongoing thoughts of SI, says he knows someone that can supply him with fentanyl liquid and thinks about overdosing. Pt contracts for safety. Pt denies HI, NAD noted, will continue to monitor.

## 2017-04-22 NOTE — ASSESSMENT & PLAN NOTE
-Patient with history of opiate, benzodiazepine, alcohol, and THC use disorder. UTox + for opiates and THC on admission  - Reports last opiate use was at 7 PM before presenting to ER and last use of alcohol was at 3 PM before presenting to ER yesterday.  - Patient currently reporting opiate withdrawal symptoms with abdominal cramps and chills  - Continue opiate withdrawal medications including Bentyl, Robaxin, Imodium, Ibuprofen, Zofran, and Clonidine  -  on cessation and discuss potential rehab options

## 2017-04-22 NOTE — PLAN OF CARE
Problem: Patient Care Overview (Adult)  Goal: Plan of Care Review  Outcome: Ongoing (interventions implemented as appropriate)  Understands need to be in hospital and take medication. Cooperative with medication administration.  Goal: Interdisciplinary Rounds/Family Conf  Outcome: Ongoing (interventions implemented as appropriate)  Patient participated with interdisciplinary rounds.    Problem: Depression, PHP  Goal: LTG-Reduction/Resolution of depression  Outcome: Ongoing (interventions implemented as appropriate)  Isolating in bed with minimal interaction with others.  Goal: STG-Verbalize alternatives to suicide  Outcome: Ongoing (interventions implemented as appropriate)  Denies suicide ideation at this time.  Verbalized no thoughts to harm self.  Cooperative with staff.    Comments:   Understands need to be in hospital and take medication. Cooperative with medication administration.  Patient participated with interdisciplinary rounds.   Isolating in bed with minimal interaction with others.  Denies suicide ideation at this time. Verbalized no thoughts to harm self. Cooperative with staff.

## 2017-04-22 NOTE — PLAN OF CARE
Problem: Patient Care Overview (Adult)  Goal: Plan of Care Review  Outcome: Ongoing (interventions implemented as appropriate)  Pt endorses fleeting thought of suicide by injecting himself with fentanyl and heroine. Pt states that he has easy access to liquid fentanyl from a friend that works in a hospital. Pt contracts for safety, denies thoughts of harming others, denies AV hallucinations. Pt appears to be withdrawing, requested to shower at 1145pm and was diaphoretic. Pt was allowed to shower and given bentyl and prn clonodine. Pt appeared to sleep soundly after that. Pt was compliant and visible on the unit, no problems with appetite or sleep reported or noted, MVC maintained.

## 2017-04-22 NOTE — SUBJECTIVE & OBJECTIVE
"Interval History: Per nursing notes, Pt endorses fleeting thought of suicide by injecting himself with fentanyl and heroine. Pt states that he has easy access to liquid fentanyl from a friend that works in a hospital. Pt contracts for safety, denies thoughts of harming others, denies AV hallucinations. Pt appears to be withdrawing, requested to shower at 1145pm and was diaphoretic. Pt was allowed to shower and given bentyl and prn clonodine. Pt appeared to sleep soundly after that. Pt was compliant and visible on the unit, no problems with appetite or sleep reported or noted, MVC maintained    On my interview this morning, patient reports poor sleep overnight. States that he woke up every 2 hours due to his withdrawal symptoms.  Patient received Zofran PRN nausea at 1615, Ibuprofen PRN pain at 1719 and Clonidine PRN opiate withdrawal symptoms at 2358.  Patient encouraged to continue to ask for PRN medications as needed for opiate withdrawals.  Notes that his mood and withdrawal symptoms are getting "a little bit better".  Patient requesting to be placed back on Gabapentin which he was taking during previous admission in 6/2016.  States that Gabapentin was helpful for his anxiety and back pain. Tolerating Lexapro and Remeron without side effects. Patient notes having active SI with plan to overdose on heroin or Fentanyl yesterday. Denies active SI today but continues to report passive SI on my interview. Patient able to contract for safety and states that he would let someone know if he was having thoughts of hurting himself.  Denies HI or AH/VH.     Psychotherapeutics     Start     Stop Route Frequency Ordered    04/21/17 0632  haloperidol tablet 5 mg  (Med - Acute  Behavioral Management)      -- Oral Every 4 hours PRN 04/21/17 0549    04/21/17 0632  lorazepam tablet 2 mg  (Med - Acute  Behavioral Management)      -- Oral Every 4 hours PRN 04/21/17 0549    04/21/17 0632  haloperidol lactate injection 5 mg  (Med - " "Acute  Behavioral Management)      -- IM Every 4 hours PRN 04/21/17 0549    04/21/17 0632  lorazepam injection 2 mg  (Med - Acute  Behavioral Management)      -- IM Every 4 hours PRN 04/21/17 0549    04/21/17 1230  diazePAM tablet 10 mg      -- Oral 2 times daily 04/21/17 1129    04/21/17 1245  escitalopram oxalate tablet 10 mg      -- Oral Daily 04/21/17 1130    04/21/17 2100  mirtazapine tablet 30 mg      -- Oral Nightly 04/21/17 1130           Review of Systems  Objective:     Vital Signs (Most Recent):  Temp: 99 °F (37.2 °C) (04/21/17 1930)  Pulse: (!) 48 (04/21/17 1930)  Resp: 16 (04/21/17 1930)  BP: 129/70 (04/21/17 1930) Vital Signs (24h Range):  Temp:  [98 °F (36.7 °C)-99 °F (37.2 °C)] 99 °F (37.2 °C)  Pulse:  [48-59] 48  Resp:  [16] 16  BP: (118-129)/(70-75) 129/70     Height: 6' 1" (185.4 cm)  Weight: 71.7 kg (158 lb 1.1 oz)  Body mass index is 20.85 kg/(m^2).    No intake or output data in the 24 hours ending 04/22/17 0801    Physical Exam   PMHx  Past Medical History Reviewed    ROS  General ROS: positive for  - chills and night sweats  negative for - fever  ENT ROS: positive for - rhinorrhea  negative for - sore throat  Respiratory ROS:   negative for - cough, shortness of breath or wheezing  Cardiovascular ROS: negative for - chest pain, dyspnea on exertion or palpitations  Gastrointestinal ROS: positive for - nausea and diarrhea  negative for - abdominal pain or constipation  Musculoskeletal ROS: positive for - pain in back  negative for - muscular weakness  Neurological ROS: negative for - dizziness or headaches      EXAMINATION    CONSTITUTIONAL  General Appearance: Disheveled, Casually dressed    MUSCULOSKELETAL  Muscle Strength and Tone: Able to move all 4 extremities  Abnormal Involuntary Movements: No tics or tremors  Gait and Station: Able to ambulate independently    PSYCHIATRIC   Level of Consciousness: Alert, Awake  Orientation: Oriented to person, place, situation  Grooming: " "Disheveled  Psychomotor Behavior: No PMR or PMA  Speech: Conversational rate, tone, volume  Language: English speaking. Appropriate vocabulary  Mood: "A little better"  Affect: Constricted  Thought Process: Linear  Associations: No looseness of associations  Thought Content: Passive SI. No HI or AH/VH  Memory: Intact  Attention: Intact  Fund of Knowledge: Appropriate  Insight: Fair  Judgment: Fair     Significant Labs:   Last 24 Hours:   Recent Lab Results       04/22/17  0558      Cholesterol 140  Comment:  The National Cholesterol Education Program (NCEP) has set the  following guidelines (reference ranges) for Cholesterol:  Optimal.....................<200 mg/dL  Borderline High.............200-239 mg/dL  High........................> or = 240 mg/dL       Folate 14.3     Free T4 0.92     HDL 46  Comment:  The National Cholesterol Education Program (NCEP) has set the  following guidelines (reference values) for HDL Cholesterol:  Low...............<40 mg/dL  Optimal...........>60 mg/dL       HDL/Chol Ratio 32.9     LDL Cholesterol 75.6  Comment:  The National Cholesterol Education Program (NCEP) has set the  following guidelines (reference values) for LDL Cholesterol:  Optimal.......................<130 mg/dL  Borderline High...............130-159 mg/dL  High..........................160-189 mg/dL  Very High.....................>190 mg/dL       Non-HDL Cholesterol 94  Comment:  Risk category and Non-HDL cholesterol goals:  Coronary heart disease (CHD)or equivalent (10-year risk of CHD >20%):  Non-HDL cholesterol goal     <130 mg/dL  Two or more CHD risk factors and 10-year risk of CHD <= 20%:  Non-HDL cholesterol goal     <160 mg/dL  0 to 1 CHD risk factor:  Non-HDL cholesterol goal     <190 mg/dL       T3, Total 78     Total Cholesterol/HDL Ratio 3.0     Triglycerides 92  Comment:  The National Cholesterol Education Program (NCEP) has set the  following guidelines (reference values) for " triglycerides:  Normal......................<150 mg/dL  Borderline High.............150-199 mg/dL  High........................200-499 mg/dL       Vitamin B-12 686           Significant Imaging: None

## 2017-04-22 NOTE — NURSING
Pt up out of bed requesting to shower bc of extreme diaphoresis, shower allowed for pt even though it is outside of shower time. Pt appears to be withdrawing. Will offer prn clonodine and scheduled bentyl.
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
## 2017-04-22 NOTE — ASSESSMENT & PLAN NOTE
- history of Fentanyl and heroin use  - Continue opiate withdrawal medications   - Encourage residential substance abuse rehab following discharge

## 2017-04-22 NOTE — PROGRESS NOTES
"Patient requested multiple PRN meds for body aches.  PRN medication provided.  Patient refused scheduled 14 mg nicotine patch stating "the doctor was increasing his patch to 21 mg".  "

## 2017-04-22 NOTE — ASSESSMENT & PLAN NOTE
- Patient with significant history of substance abuse  - Previously admitted to [facility] in 6/2016 and was discharged on Depakote 500 mg PO BID and Gabapentin 400 mg PO q6 for anxiety. Patient reports he has been off medications for almost a year and Depakote level was undetectable on admission  - Continue Lexapro 10 mg PO daily  - Continue Remeron 30 mg PO QHS  - Start Gabapentin 300 mg PO TID  - Continue Folbic

## 2017-04-22 NOTE — PROGRESS NOTES
Patient ate dinner and complained of nausea and muscle aches.  PRN meds provided.  Patient shared desire to go to rehab.  Patient admitted he still thinks about shooting up.  Encouraged patient to re-connect with family to help with support during his rehabilitation.  Patient verbalized understanding.

## 2017-04-23 LAB — HIV1+2 IGG SERPL QL IA.RAPID: NEGATIVE

## 2017-04-23 PROCEDURE — 97150 GROUP THERAPEUTIC PROCEDURES: CPT

## 2017-04-23 PROCEDURE — 86592 SYPHILIS TEST NON-TREP QUAL: CPT

## 2017-04-23 PROCEDURE — 25000003 PHARM REV CODE 250: Performed by: PSYCHIATRY & NEUROLOGY

## 2017-04-23 PROCEDURE — 86703 HIV-1/HIV-2 1 RESULT ANTBDY: CPT

## 2017-04-23 PROCEDURE — 80074 ACUTE HEPATITIS PANEL: CPT

## 2017-04-23 PROCEDURE — 97166 OT EVAL MOD COMPLEX 45 MIN: CPT

## 2017-04-23 PROCEDURE — 99232 SBSQ HOSP IP/OBS MODERATE 35: CPT | Mod: AF,S$PBB,, | Performed by: PSYCHIATRY & NEUROLOGY

## 2017-04-23 PROCEDURE — 12400001 HC PSYCH SEMI-PRIVATE ROOM

## 2017-04-23 PROCEDURE — 25000003 PHARM REV CODE 250: Performed by: STUDENT IN AN ORGANIZED HEALTH CARE EDUCATION/TRAINING PROGRAM

## 2017-04-23 PROCEDURE — 36415 COLL VENOUS BLD VENIPUNCTURE: CPT

## 2017-04-23 RX ORDER — HALOPERIDOL 5 MG/1
5 TABLET ORAL ONCE
Status: COMPLETED | OUTPATIENT
Start: 2017-04-23 | End: 2017-04-23

## 2017-04-23 RX ORDER — GABAPENTIN 300 MG/1
600 CAPSULE ORAL 3 TIMES DAILY
Status: DISCONTINUED | OUTPATIENT
Start: 2017-04-23 | End: 2017-04-26 | Stop reason: HOSPADM

## 2017-04-23 RX ORDER — ACETAMINOPHEN 325 MG/1
650 TABLET ORAL EVERY 6 HOURS PRN
Status: DISCONTINUED | OUTPATIENT
Start: 2017-04-23 | End: 2017-04-26 | Stop reason: HOSPADM

## 2017-04-23 RX ORDER — LIDOCAINE 50 MG/G
1 PATCH TOPICAL ONCE
Status: COMPLETED | OUTPATIENT
Start: 2017-04-23 | End: 2017-04-24

## 2017-04-23 RX ORDER — DIPHENHYDRAMINE HCL 50 MG
50 CAPSULE ORAL ONCE
Status: COMPLETED | OUTPATIENT
Start: 2017-04-23 | End: 2017-04-23

## 2017-04-23 RX ADMIN — Medication 1 TABLET: at 08:04

## 2017-04-23 RX ADMIN — METHOCARBAMOL 500 MG: 500 TABLET ORAL at 07:04

## 2017-04-23 RX ADMIN — MIRTAZAPINE 30 MG: 30 TABLET, FILM COATED ORAL at 09:04

## 2017-04-23 RX ADMIN — DIPHENHYDRAMINE HYDROCHLORIDE 50 MG: 50 CAPSULE ORAL at 01:04

## 2017-04-23 RX ADMIN — LIDOCAINE 1 PATCH: 50 PATCH TOPICAL at 02:04

## 2017-04-23 RX ADMIN — HYDROXYZINE PAMOATE 50 MG: 50 CAPSULE ORAL at 11:04

## 2017-04-23 RX ADMIN — DICYCLOMINE HYDROCHLORIDE 20 MG: 20 TABLET ORAL at 07:04

## 2017-04-23 RX ADMIN — NICOTINE 1 PATCH: 14 PATCH, EXTENDED RELEASE TRANSDERMAL at 08:04

## 2017-04-23 RX ADMIN — METHOCARBAMOL 500 MG: 500 TABLET ORAL at 10:04

## 2017-04-23 RX ADMIN — THIAMINE HCL TAB 100 MG 100 MG: 100 TAB at 08:04

## 2017-04-23 RX ADMIN — DIAZEPAM 10 MG: 5 TABLET ORAL at 08:04

## 2017-04-23 RX ADMIN — LOPERAMIDE HYDROCHLORIDE 2 MG: 2 CAPSULE ORAL at 02:04

## 2017-04-23 RX ADMIN — GABAPENTIN 600 MG: 300 CAPSULE ORAL at 01:04

## 2017-04-23 RX ADMIN — GABAPENTIN 600 MG: 300 CAPSULE ORAL at 09:04

## 2017-04-23 RX ADMIN — DIAZEPAM 10 MG: 5 TABLET ORAL at 09:04

## 2017-04-23 RX ADMIN — DICYCLOMINE HYDROCHLORIDE 10 MG: 10 CAPSULE ORAL at 11:04

## 2017-04-23 RX ADMIN — CLONIDINE HYDROCHLORIDE 0.1 MG: 0.1 TABLET ORAL at 07:04

## 2017-04-23 RX ADMIN — GABAPENTIN 300 MG: 300 CAPSULE ORAL at 08:04

## 2017-04-23 RX ADMIN — HALOPERIDOL 5 MG: 5 TABLET ORAL at 01:04

## 2017-04-23 RX ADMIN — PROMETHAZINE HYDROCHLORIDE 25 MG: 12.5 TABLET ORAL at 12:04

## 2017-04-23 RX ADMIN — IBUPROFEN 600 MG: 600 TABLET, FILM COATED ORAL at 11:04

## 2017-04-23 RX ADMIN — ESCITALOPRAM OXALATE 10 MG: 10 TABLET ORAL at 01:04

## 2017-04-23 RX ADMIN — THERA TABS 1 TABLET: TAB at 08:04

## 2017-04-23 NOTE — ASSESSMENT & PLAN NOTE
-Patient with history of opiate, benzodiazepine, alcohol, and THC use disorder. UTox + for opiates and THC on admission  - Reports last opiate use was at 7 PM before presenting to ER and last use of alcohol was at 3 PM before presenting to ER.  - Patient currently reporting opiate withdrawal symptoms with abdominal cramps and chills  - Continue opiate withdrawal medications including Bentyl, Robaxin, Imodium, Ibuprofen, Zofran, and Clonidine  -  on cessation and discuss potential rehab options  -Patient requested to be tested for HIV, Hep C, Syphilis, C/G   - Ordered labwork 4/24/17
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
## 2017-04-23 NOTE — NURSING
"Pt very agitated and aggressive, pacing the hallway, punching the wall. He screams "I can't take this!". Pt wants more medicine and asks for b52 or Ativan.. Pt has been med seeking from the beginning on his stay here. Charge nurse is giving pt prn comfort meds, will continue to monitor situation. Pt tung states "I'm just gonna punch myself!" Pt advised not to do that.   "

## 2017-04-23 NOTE — PROGRESS NOTES
[facility]  Psychiatry  Progress Note    Patient Name: Robert Batres  MRN: [id number]   Code Status: Full Code  Admission Date: 2017  Hospital Length of Stay: 2 days  Expected Discharge Date: 2017  Attending Physician: Mariana Vargas MD  Primary Care Provider: Kirk Perez MD    Current Legal Status: [facility] [place]    Patient information was obtained from patient, past medical records and ER records.     Subjective:     Principal Problem:Opioid dependence with withdrawal    HPI: Chief Complaint:  Suicidal ideations       HPI: Per ED note, 22 yo pmhx polysubstance abuse, substance induced mood disorder, prior suicide attempts presenting with SI. Patient has been thinking about overdosing, has ideation of injecting large doses of fentanyl and heroin combined in hopes of killing himself. He reports researching the right amount to kill himself, 5000 mcg and 1000 mcg. He has purchased a larger needle to inject himself. Patient reports feeling more depressed over the past several days. He reports using fentanyl and heroin today, at 7 PM. Drinking vodka this afternoon, last drink was at 3PM. Denies HI. Denies hallucinations. Has attempted suicide in the past. Previously had inpatient psych hospitalizations.       On my initial evaluation, patient seen lying in bed asleep. Patient in no apparent distress and easily awoken for interview. Patient reports that he has been feeling depressed for the past couple of weeks. Reports that he lost a friend in March due to an accidental overdose on Heroin and Fentanyl. States that he told himself he would stop using after his friend  but states that his IV drug use has gotten even worse over the past few weeks. States that he feels guilty about his continued drug use and reports having suicidal ideations for past few weeks with plan to inject lethal amount of heroin and Fentanyl. Reports several previous suicide attempts by overdosing of Seroquel or Xanax  "or trying to cut himself. Patient complaining of withdrawal symptoms at this time with stomach cramps and chills. Reports last use of IV heroin was at 7 PM before coming into the ER. Patient describes his mood as depressed. Reports difficulty with sleeping. Poor appetite. Endorsing feelings of helplessness and hopelessness. Endorsing anhedonia. Continues to endorse passive SI at time of my interview. Denies having active SI at this time. No HI or AH/VH.          Per chart review, patient was hospitalized at [facility] inpatient psychiatric unit from 6/7/16-6/13/16 after presenting reporting SI and significant benzodiazepine abuse and desire to quit. Patient was diagnosed with SIMD, Benzodiazepine use disorder, Alcohol use disorder, THC use disorder and Personality Disorder NOS. Patient discharged on Depakote 500 Mg PO BID and Gabapentin 400 mg PO q6 for anxiety.      Patient with multiple similar presentations to ER. Most recently 3/14/17 when he presented with complaint of SI and stated that he wanted to get off opiates and benzos. Psychiatry was consulted however patient was largely uncooperative with interview. Patient reported that he had come to the hospital for detox and denied any SI. Patient was determined not to meet criteria for PEC and was discharged.          Hospital Course: 4/21/17: In addition to depressed mood, patient endorses daily heroin and fentanyl daily. He also drinks 3-4 alcoholic beverages daily. Will begin Valium 10mg po BID, Lexapro 10mg po daily and Remeron 30mg po QHS and continue prn medications for opioid withdrawal.           Interval History: This morning, Mr. Batres reports that he is at the "peak of my withdrawals". He reports that he has been sweating today, and he is worried about getting serotonin syndrome. We discussed the likelihood of serotonin syndrome vs withdrawal from alcohol/opiates. He agreed to continue taking his medications, but reports that if he continues to have " "sweating, he may ask for a different medicine besides lexapro. He reports intense pain today, and he punched a wall just prior to our interview. He reports that he has been having severe back pain, which normally is masked by his opiate use. We discussed that he would not be getting any opiate medicaitons while detoxing, and he is amenable to alternating tylenol/ibuprofen for his generalized pain. He reports that he plans to go to "a 60-90 day program right after my birthday", and that he plans to go stay with his father for 10 days after leaving the hospital.    Psychotherapeutics     Start     Stop Route Frequency Ordered    04/21/17 1230  diazePAM tablet 10 mg      -- Oral 2 times daily 04/21/17 1129    04/21/17 1245  escitalopram oxalate tablet 10 mg      -- Oral Daily 04/21/17 1130    04/21/17 2100  mirtazapine tablet 30 mg      -- Oral Nightly 04/21/17 1130    04/22/17 1244  lorazepam injection 2 mg  (Med - Acute  Behavioral Management)      -- IM Every 4 hours PRN 04/22/17 1245    04/22/17 1244  haloperidol tablet 5 mg  (Med - Acute  Behavioral Management)      -- Oral Every 4 hours PRN 04/22/17 1245    04/22/17 1244  lorazepam tablet 2 mg  (Med - Acute  Behavioral Management)      -- Oral Every 4 hours PRN 04/22/17 1245    04/22/17 1244  haloperidol lactate injection 5 mg  (Med - Acute  Behavioral Management)      -- IM Every 4 hours PRN 04/22/17 1245    04/23/17 1315  haloperidol tablet 5 mg      -- Oral Once 04/23/17 1211           Review of Systems   Constitutional: Positive for diaphoresis. Negative for appetite change, chills and fever.   Gastrointestinal: Positive for nausea. Negative for constipation and diarrhea.   Psychiatric/Behavioral: Positive for agitation and dysphoric mood. Negative for confusion and hallucinations.     Objective:     Vital Signs (Most Recent):  Temp: 98.7 °F (37.1 °C) (04/23/17 0745)  Pulse: (!) 58 (04/23/17 0745)  Resp: 14 (04/23/17 0745)  BP: (!) 142/87 (04/23/17 0745) " "Vital Signs (24h Range):  Temp:  [98.7 °F (37.1 °C)-98.9 °F (37.2 °C)] 98.7 °F (37.1 °C)  Pulse:  [53-62] 58  Resp:  [14-16] 14  BP: (119-142)/(72-87) 142/87     Height: 6' 1" (185.4 cm)  Weight: 71.7 kg (158 lb 1.1 oz)  Body mass index is 20.85 kg/(m^2).    No intake or output data in the 24 hours ending 04/23/17 1256    Physical Exam   Appearance: no apparent distress but uncomfortable appearing, casually dressed  Behavior/Cooperation/Attitude: cooperative, engaged, irritable  Speech: conversational rate/tone/volume  Mood: "in pain"  Affect: reactive/appropriate  Thought Process: linear, goal directed  Thought Content: denies SI/HI/AVH  Sensorium: awake/alert  Orientation: oriented to person/place/day of week/situation  Attention/Memory: recent and remote intact  Calculation/Concentration: intact to conversation  Fund of Knowledge: intact to conversation  Abstraction: intact to conversation  Insight: fair  Judgment: appropriate for setting  Impulse Control: fair  Reliability: fair    Significant Labs:   Last 24 Hours:   Recent Lab Results     None          Significant Imaging: None    Assessment/Plan:     * Opioid dependence with withdrawal  - history of Fentanyl and heroin use  - Continue opiate withdrawal medications   - Encourage residential substance abuse rehab following discharge   - patient is interested in "a 60 to 90 day program after my birthday"  - Added Lidocaine patch for 1 day for back pain that patient reports is usually masked by opiate use  - Alternate Tylenol with ibuprofen (liver enzymes WNL)    Substance induced mood disorder  - Patient with significant history of substance abuse  - Previously admitted to [facility] in 6/2016 and was discharged on Depakote 500 mg PO BID and Gabapentin 400 mg PO q6 for anxiety. Patient reports he has been off medications for almost a year and Depakote level was undetectable on admission  - Continue Lexapro 10 mg PO daily  - Continue Remeron 30 mg PO QHS  - " Increasing Gabapentin to 600 mg PO TID  - Continue Folbic    Alcohol dependence  - Patient with history of alcohol abuse  - Continue Valium taper    Polysubstance abuse  -Patient with history of opiate, benzodiazepine, alcohol, and THC use disorder. UTox + for opiates and THC on admission  - Reports last opiate use was at 7 PM before presenting to ER and last use of alcohol was at 3 PM before presenting to ER.  - Patient currently reporting opiate withdrawal symptoms with abdominal cramps and chills  - Continue opiate withdrawal medications including Bentyl, Robaxin, Imodium, Ibuprofen, Zofran, and Clonidine  -  on cessation and discuss potential rehab options  -Patient requested to be tested for HIV, Hep C, Syphilis, C/G   - Ordered labwork 4/24/17    Alcohol withdrawal  - Vital signs stable  - Continue Valium taper       Need for Continued Hospitalization:   Protective inpatient psychiatric hospitalization required while a safe disposition plan is enacted. and Requires ongoing hospitalization for stabilization of medications.    Anticipated Disposition: Rehab Facility vs home/self care    Kai Taylor MD   Psychiatry  [facility]

## 2017-04-23 NOTE — SUBJECTIVE & OBJECTIVE
"Interval History: This morning, Mr. Batres reports that he is at the "peak of my withdrawals". He reports that he has been sweating today, and he is worried about getting serotonin syndrome. We discussed the likelihood of serotonin syndrome vs withdrawal from alcohol/opiates. He agreed to continue taking his medications, but reports that if he continues to have sweating, he may ask for a different medicine besides lexapro. He reports intense pain today, and he punched a wall just prior to our interview. He reports that he has been having severe back pain, which normally is masked by his opiate use. We discussed that he would not be getting any opiate medicaitons while detoxing, and he is amenable to alternating tylenol/ibuprofen for his generalized pain. He reports that he plans to go to "a 60-90 day program right after my birthday", and that he plans to go stay with his father for 10 days after leaving the hospital.    Psychotherapeutics     Start     Stop Route Frequency Ordered    04/21/17 1230  diazePAM tablet 10 mg      -- Oral 2 times daily 04/21/17 1129    04/21/17 1245  escitalopram oxalate tablet 10 mg      -- Oral Daily 04/21/17 1130    04/21/17 2100  mirtazapine tablet 30 mg      -- Oral Nightly 04/21/17 1130    04/22/17 1244  lorazepam injection 2 mg  (Med - Acute  Behavioral Management)      -- IM Every 4 hours PRN 04/22/17 1245    04/22/17 1244  haloperidol tablet 5 mg  (Med - Acute  Behavioral Management)      -- Oral Every 4 hours PRN 04/22/17 1245    04/22/17 1244  lorazepam tablet 2 mg  (Med - Acute  Behavioral Management)      -- Oral Every 4 hours PRN 04/22/17 1245    04/22/17 1244  haloperidol lactate injection 5 mg  (Med - Acute  Behavioral Management)      -- IM Every 4 hours PRN 04/22/17 1245    04/23/17 1315  haloperidol tablet 5 mg      -- Oral Once 04/23/17 1211           Review of Systems   Constitutional: Positive for diaphoresis. Negative for appetite change, chills and fever. " "  Gastrointestinal: Positive for nausea. Negative for constipation and diarrhea.   Psychiatric/Behavioral: Positive for agitation and dysphoric mood. Negative for confusion and hallucinations.     Objective:     Vital Signs (Most Recent):  Temp: 98.7 °F (37.1 °C) (04/23/17 0745)  Pulse: (!) 58 (04/23/17 0745)  Resp: 14 (04/23/17 0745)  BP: (!) 142/87 (04/23/17 0745) Vital Signs (24h Range):  Temp:  [98.7 °F (37.1 °C)-98.9 °F (37.2 °C)] 98.7 °F (37.1 °C)  Pulse:  [53-62] 58  Resp:  [14-16] 14  BP: (119-142)/(72-87) 142/87     Height: 6' 1" (185.4 cm)  Weight: 71.7 kg (158 lb 1.1 oz)  Body mass index is 20.85 kg/(m^2).    No intake or output data in the 24 hours ending 04/23/17 1256    Physical Exam   Appearance: no apparent distress but uncomfortable appearing, casually dressed  Behavior/Cooperation/Attitude: cooperative, engaged, irritable  Speech: conversational rate/tone/volume  Mood: "in pain"  Affect: reactive/appropriate  Thought Process: linear, goal directed  Thought Content: denies SI/HI/AVH  Sensorium: awake/alert  Orientation: oriented to person/place/day of week/situation  Attention/Memory: recent and remote intact  Calculation/Concentration: intact to conversation  Fund of Knowledge: intact to conversation  Abstraction: intact to conversation  Insight: fair  Judgment: appropriate for setting  Impulse Control: fair  Reliability: fair    Significant Labs:   Last 24 Hours:   Recent Lab Results     None          Significant Imaging: None  "

## 2017-04-23 NOTE — ASSESSMENT & PLAN NOTE
- Patient with significant history of substance abuse  - Previously admitted to [facility] in 6/2016 and was discharged on Depakote 500 mg PO BID and Gabapentin 400 mg PO q6 for anxiety. Patient reports he has been off medications for almost a year and Depakote level was undetectable on admission  - Continue Lexapro 10 mg PO daily  - Continue Remeron 30 mg PO QHS  - Increasing Gabapentin to 600 mg PO TID  - Continue Folbic

## 2017-04-23 NOTE — NURSING
Initial contact with pt, pt reports that for the first time in a long time he does not feel suicidal. Pt does ask for prn comfort meds constantly for various somatic concerns. Pt denies HI/SI, prn meds provided per schedule. Pt advised he received robaxin and gabapentin when he asked for ibuprofen.

## 2017-04-23 NOTE — ASSESSMENT & PLAN NOTE
"- history of Fentanyl and heroin use  - Continue opiate withdrawal medications   - Encourage residential substance abuse rehab following discharge   - patient is interested in "a 60 to 90 day program after my birthday"  - Added Lidocaine patch for 1 day for back pain that patient reports is usually masked by opiate use  - Alternate Tylenol with ibuprofen (liver enzymes WNL)  "

## 2017-04-23 NOTE — PLAN OF CARE
"Problem: Patient Care Overview (Adult)  Goal: Plan of Care Review  Outcome: Ongoing (interventions implemented as appropriate)  Pt began day asking for various prn medications for his withdrawal symptoms, which he states "it's at its peak". Later reported it was the first day in a long time that he did not feel suicidal. As the day progressed, pt became more agitated and had various somatic symptoms. Pt asked for and received multiple prn comfort meds. Pt became agitated and aggressive when he did not receive ibuprofen for back pain after receiving robaxin and gabapentin. Pt was also screaming that he wanted ativan and haldol, stating "if I really wanted it I could get it".  Pt punched the wall with his left hand and later c/o of hand pain. Pt was advised that if he continued with self harm behavior he would have to be put in restraints. Pt was told that his behavior was inappropriate. Pt was given oral haldol and benadryl to help him relax. Pt was redirected multiple times, he later admitted he was manipulative and apologized for his outburst. Pt soon asked for more haldol and benadryl after because he stated a high tolerance, request was denied. Pt was then seen going to bed after dinner. VSS, denies SI/HI, will continue to monitor.       "

## 2017-04-24 LAB
HAV IGM SERPL QL IA: NEGATIVE
HBV CORE IGM SERPL QL IA: NEGATIVE
HBV SURFACE AG SERPL QL IA: NEGATIVE
HCV AB SERPL QL IA: NEGATIVE
RPR SER QL: NORMAL

## 2017-04-24 PROCEDURE — 12400001 HC PSYCH SEMI-PRIVATE ROOM

## 2017-04-24 PROCEDURE — 99233 SBSQ HOSP IP/OBS HIGH 50: CPT | Mod: AF,S$PBB,, | Performed by: PSYCHIATRY & NEUROLOGY

## 2017-04-24 PROCEDURE — 25000003 PHARM REV CODE 250: Performed by: PSYCHIATRY & NEUROLOGY

## 2017-04-24 PROCEDURE — 25000003 PHARM REV CODE 250: Performed by: STUDENT IN AN ORGANIZED HEALTH CARE EDUCATION/TRAINING PROGRAM

## 2017-04-24 PROCEDURE — 63600175 PHARM REV CODE 636 W HCPCS: Performed by: PSYCHIATRY & NEUROLOGY

## 2017-04-24 RX ORDER — DIAZEPAM 5 MG/1
5 TABLET ORAL 2 TIMES DAILY
Status: DISCONTINUED | OUTPATIENT
Start: 2017-04-24 | End: 2017-04-25

## 2017-04-24 RX ADMIN — HALOPERIDOL 5 MG: 5 TABLET ORAL at 11:04

## 2017-04-24 RX ADMIN — CLONIDINE HYDROCHLORIDE 0.1 MG: 0.1 TABLET ORAL at 11:04

## 2017-04-24 RX ADMIN — MIRTAZAPINE 30 MG: 30 TABLET, FILM COATED ORAL at 08:04

## 2017-04-24 RX ADMIN — THIAMINE HCL TAB 100 MG 100 MG: 100 TAB at 08:04

## 2017-04-24 RX ADMIN — THERA TABS 1 TABLET: TAB at 08:04

## 2017-04-24 RX ADMIN — GABAPENTIN 600 MG: 300 CAPSULE ORAL at 09:04

## 2017-04-24 RX ADMIN — GABAPENTIN 600 MG: 300 CAPSULE ORAL at 06:04

## 2017-04-24 RX ADMIN — DIAZEPAM 10 MG: 5 TABLET ORAL at 08:04

## 2017-04-24 RX ADMIN — IBUPROFEN 600 MG: 600 TABLET, FILM COATED ORAL at 05:04

## 2017-04-24 RX ADMIN — LORAZEPAM 2 MG: 1 TABLET ORAL at 11:04

## 2017-04-24 RX ADMIN — LORAZEPAM 2 MG: 2 INJECTION INTRAMUSCULAR; INTRAVENOUS at 07:04

## 2017-04-24 RX ADMIN — Medication 1 TABLET: at 08:04

## 2017-04-24 RX ADMIN — METHOCARBAMOL 500 MG: 500 TABLET ORAL at 03:04

## 2017-04-24 RX ADMIN — DICYCLOMINE HYDROCHLORIDE 10 MG: 10 CAPSULE ORAL at 06:04

## 2017-04-24 RX ADMIN — DIPHENHYDRAMINE HYDROCHLORIDE 50 MG: 50 CAPSULE ORAL at 11:04

## 2017-04-24 RX ADMIN — ESCITALOPRAM OXALATE 10 MG: 10 TABLET ORAL at 08:04

## 2017-04-24 RX ADMIN — HYDROXYZINE PAMOATE 50 MG: 50 CAPSULE ORAL at 05:04

## 2017-04-24 RX ADMIN — DIAZEPAM 5 MG: 5 TABLET ORAL at 08:04

## 2017-04-24 RX ADMIN — GABAPENTIN 600 MG: 300 CAPSULE ORAL at 03:04

## 2017-04-24 RX ADMIN — NICOTINE 1 PATCH: 14 PATCH, EXTENDED RELEASE TRANSDERMAL at 08:04

## 2017-04-24 RX ADMIN — DICYCLOMINE HYDROCHLORIDE 10 MG: 10 CAPSULE ORAL at 11:04

## 2017-04-24 RX ADMIN — DIPHENHYDRAMINE HYDROCHLORIDE 50 MG: 50 INJECTION, SOLUTION INTRAMUSCULAR; INTRAVENOUS at 08:04

## 2017-04-24 RX ADMIN — HALOPERIDOL LACTATE 5 MG: 5 INJECTION, SOLUTION INTRAMUSCULAR at 08:04

## 2017-04-24 RX ADMIN — METHOCARBAMOL 500 MG: 500 TABLET ORAL at 08:04

## 2017-04-24 NOTE — SUBJECTIVE & OBJECTIVE
"Interval History: Patient states he had a rough weekend, but is feeling better today. Patient came in and brought a list of short term and long term goals. He is apprehensive about entering a rehab program immediately upon discharge. States   He has called any programs as of yet, but plans to   Patient swears on his mothers life, who has cancer, that he won't stick another needle in his arm. During interview, patient requested something to help calm him down, but was not willing to increase the lexapro due to night sweats. Patient was encouraged to call rehab programs. He denies suicidal ideation.     PMHx  Past Medical History Reviewed    ROS  General ROS: negative for - malaise  Neurological ROS: positive for - tremors      EXAMINATION    VITALS   Vitals:    04/24/17 0800   BP: (!) 141/77   Pulse: 60   Resp: 17   Temp: 97.9 °F (36.6 °C)       CONSTITUTIONAL  General Appearance: thin, long hair    MUSCULOSKELETAL  Muscle Strength and Tone: no weakness  Abnormal Involuntary Movements: tremor with outstretched hands  Gait and Station: ambulates without assistance     PSYCHIATRIC   Physical Exam  Appearance: appears stated age  Behavior/Cooperation/Attitude: cooperative, fidgety, restless  Speech: conversational rate/tone/volume  Mood: "better"  Affect: superficial  Thought Process: linear, goal directed  Thought Content: denies SI/HI/AVH  Sensorium: awake/alert  Orientation: oriented to person/place/situation  Attention/Memory: recent and remote intact  Calculation/Concentration: intact to conversation  Fund of Knowledge: intact to conversation  Abstraction: intact to conversation  Insight: fair  Judgment: limited   Impulse Control: limited  Reliability: questionable      Psychotherapeutics     Start     Stop Route Frequency Ordered    04/21/17 1230  diazePAM tablet 10 mg      -- Oral 2 times daily 04/21/17 1129    04/21/17 1245  escitalopram oxalate tablet 10 mg      -- Oral Daily 04/21/17 1130    04/21/17 2100  " "mirtazapine tablet 30 mg      -- Oral Nightly 04/21/17 1130    04/22/17 1244  lorazepam injection 2 mg  (Med - Acute  Behavioral Management)      -- IM Every 4 hours PRN 04/22/17 1245    04/22/17 1244  haloperidol tablet 5 mg  (Med - Acute  Behavioral Management)      -- Oral Every 4 hours PRN 04/22/17 1245    04/22/17 1244  lorazepam tablet 2 mg  (Med - Acute  Behavioral Management)      -- Oral Every 4 hours PRN 04/22/17 1245    04/22/17 1244  haloperidol lactate injection 5 mg  (Med - Acute  Behavioral Management)      -- IM Every 4 hours PRN 04/22/17 1245           Review of Systems  Objective:     Vital Signs (Most Recent):  Temp: 97.9 °F (36.6 °C) (04/24/17 0800)  Pulse: 60 (04/24/17 0800)  Resp: 17 (04/24/17 0800)  BP: (!) 141/77 (04/24/17 0800) Vital Signs (24h Range):  Temp:  [97.9 °F (36.6 °C)-98.6 °F (37 °C)] 97.9 °F (36.6 °C)  Pulse:  [60-79] 60  Resp:  [16-17] 17  BP: (117-141)/(70-77) 141/77     Height: 6' 1" (185.4 cm)  Weight: 71.7 kg (158 lb 1.1 oz)  Body mass index is 20.85 kg/(m^2).    No intake or output data in the 24 hours ending 04/24/17 1022    Physical Exam     Significant Labs:   Last 72 Hours:   Recent Lab Results       04/23/17  1326 04/22/17  0558      Cholesterol  140  Comment:  The National Cholesterol Education Program (NCEP) has set the  following guidelines (reference ranges) for Cholesterol:  Optimal.....................<200 mg/dL  Borderline High.............200-239 mg/dL  High........................> or = 240 mg/dL       Folate  14.3     Free T4  0.92     HDL  46  Comment:  The National Cholesterol Education Program (NCEP) has set the  following guidelines (reference values) for HDL Cholesterol:  Low...............<40 mg/dL  Optimal...........>60 mg/dL       HDL/Chol Ratio  32.9     HIV Rapid Testing Negative      LDL Cholesterol  75.6  Comment:  The National Cholesterol Education Program (NCEP) has set the  following guidelines (reference values) for LDL " Cholesterol:  Optimal.......................<130 mg/dL  Borderline High...............130-159 mg/dL  High..........................160-189 mg/dL  Very High.....................>190 mg/dL       Non-HDL Cholesterol  94  Comment:  Risk category and Non-HDL cholesterol goals:  Coronary heart disease (CHD)or equivalent (10-year risk of CHD >20%):  Non-HDL cholesterol goal     <130 mg/dL  Two or more CHD risk factors and 10-year risk of CHD <= 20%:  Non-HDL cholesterol goal     <160 mg/dL  0 to 1 CHD risk factor:  Non-HDL cholesterol goal     <190 mg/dL       T3, Total  78     Total Cholesterol/HDL Ratio  3.0     Triglycerides  92  Comment:  The National Cholesterol Education Program (NCEP) has set the  following guidelines (reference values) for triglycerides:  Normal......................<150 mg/dL  Borderline High.............150-199 mg/dL  High........................200-499 mg/dL       Vitamin B-12  686           Significant Imaging: None

## 2017-04-24 NOTE — PLAN OF CARE
Problem: Patient Care Overview (Adult)  Goal: Plan of Care Review  Outcome: Ongoing (interventions implemented as appropriate)  Patient displaying better judgment and insight during the evening. No disruptive behaviors though continues with seeking medications. Verbalized to nurse wanting to write out a plan and list of goals to maintain sobriety after discharge. Cooperative with staff. Participated in group appropriately. Medication compliant. Currently denies suicidal ideations. Suicide precautions and modified visual contact maintained per MD orders.

## 2017-04-24 NOTE — PROGRESS NOTES
[facility]  Psychiatry  Progress Note    Patient Name: Robert Batres  MRN: [id number]   Code Status: Full Code  Admission Date: 2017  Hospital Length of Stay: 3 days  Expected Discharge Date: 3-5 days  Attending Physician: Mariana Vargas MD  Primary Care Provider: Kirk Perez MD    Current Legal Status: [facility]    Patient information was obtained from patient and ER records.     Subjective:     Principal Problem:Opioid dependence with withdrawal    HPI: Chief Complaint:  Suicidal ideations       HPI: Per ED note, 22 yo pmhx polysubstance abuse, substance induced mood disorder, prior suicide attempts presenting with SI. Patient has been thinking about overdosing, has ideation of injecting large doses of fentanyl and heroin combined in hopes of killing himself. He reports researching the right amount to kill himself, 5000 mcg and 1000 mcg. He has purchased a larger needle to inject himself. Patient reports feeling more depressed over the past several days. He reports using fentanyl and heroin today, at 7 PM. Drinking vodka this afternoon, last drink was at 3PM. Denies HI. Denies hallucinations. Has attempted suicide in the past. Previously had inpatient psych hospitalizations.       On my initial evaluation, patient seen lying in bed asleep. Patient in no apparent distress and easily awoken for interview. Patient reports that he has been feeling depressed for the past couple of weeks. Reports that he lost a friend in March due to an accidental overdose on Heroin and Fentanyl. States that he told himself he would stop using after his friend  but states that his IV drug use has gotten even worse over the past few weeks. States that he feels guilty about his continued drug use and reports having suicidal ideations for past few weeks with plan to inject lethal amount of heroin and Fentanyl. Reports several previous suicide attempts by overdosing of Seroquel or Xanax or trying to cut  "himself. Patient complaining of withdrawal symptoms at this time with stomach cramps and chills. Reports last use of IV heroin was at 7 PM before coming into the ER. Patient describes his mood as depressed. Reports difficulty with sleeping. Poor appetite. Endorsing feelings of helplessness and hopelessness. Endorsing anhedonia. Continues to endorse passive SI at time of my interview. Denies having active SI at this time. No HI or AH/VH.          Per chart review, patient was hospitalized at [facility] inpatient psychiatric unit from 6/7/16-6/13/16 after presenting reporting SI and significant benzodiazepine abuse and desire to quit. Patient was diagnosed with SIMD, Benzodiazepine use disorder, Alcohol use disorder, THC use disorder and Personality Disorder NOS. Patient discharged on Depakote 500 Mg PO BID and Gabapentin 400 mg PO q6 for anxiety.      Patient with multiple similar presentations to ER. Most recently 3/14/17 when he presented with complaint of SI and stated that he wanted to get off opiates and benzos. Psychiatry was consulted however patient was largely uncooperative with interview. Patient reported that he had come to the hospital for detox and denied any SI. Patient was determined not to meet criteria for PEC and was discharged.          Hospital Course: 4/21/17: In addition to depressed mood, patient endorses daily heroin and fentanyl daily. He also drinks 3-4 alcoholic beverages daily. Will begin Valium 10mg po BID, Lexapro 10mg po daily and Remeron 30mg po QHS and continue prn medications for opioid withdrawal.   4/24/17: Patient had an episode of agitation yesterday morning, punched a wall. Was pacing the hallway, punching the wall. He screamed "I can't take this!" He explained that he was at the peak of his withdrawal. Later that evening, staff documented :"Pt asked for and received multiple prn comfort meds. Pt became agitated and aggressive when he did not receive ibuprofen for back pain after " "receiving robaxin and gabapentin. Pt was also screaming that he wanted ativan and haldol, stating "if I really wanted it I could get it".  Pt punched the wall with his left hand and later c/o of hand pain. Pt was advised that if he continued with self harm behavior he would have to be put in restraints. Pt was told that his behavior was inappropriate. Pt was given oral haldol and benadryl to help him relax. Pt was redirected multiple times, he later admitted he was manipulative and apologized for his outburst. Pt soon asked for more haldol and benadryl after because he stated a high tolerance, request was denied."        Interval History: Patient states he had a rough weekend, but is feeling better today. Patient came in and brought a list of short term and long term goals. He is apprehensive about entering a rehab program immediately upon discharge. States   He has called any programs as of yet, but plans to   Patient swears on his mothers life, who has cancer, that he won't stick another needle in his arm. During interview, patient requested something to help calm him down, but was not willing to increase the lexapro due to night sweats. Patient was encouraged to call rehab programs. He denies suicidal ideation.     PMHx  Past Medical History Reviewed    ROS  General ROS: negative for - malaise  Neurological ROS: positive for - tremors      EXAMINATION    VITALS   Vitals:    04/24/17 0800   BP: (!) 141/77   Pulse: 60   Resp: 17   Temp: 97.9 °F (36.6 °C)       CONSTITUTIONAL  General Appearance: thin, long hair    MUSCULOSKELETAL  Muscle Strength and Tone: no weakness  Abnormal Involuntary Movements: tremor with outstretched hands  Gait and Station: ambulates without assistance     PSYCHIATRIC   Physical Exam  Appearance: appears stated age  Behavior/Cooperation/Attitude: cooperative, fidgety, restless  Speech: conversational rate/tone/volume  Mood: "better"  Affect: superficial  Thought Process: linear, goal " "directed  Thought Content: denies SI/HI/AVH  Sensorium: awake/alert  Orientation: oriented to person/place/situation  Attention/Memory: recent and remote intact  Calculation/Concentration: intact to conversation  Fund of Knowledge: intact to conversation  Abstraction: intact to conversation  Insight: fair  Judgment: limited   Impulse Control: limited  Reliability: questionable      Psychotherapeutics     Start     Stop Route Frequency Ordered    04/21/17 1230  diazePAM tablet 10 mg      -- Oral 2 times daily 04/21/17 1129    04/21/17 1245  escitalopram oxalate tablet 10 mg      -- Oral Daily 04/21/17 1130    04/21/17 2100  mirtazapine tablet 30 mg      -- Oral Nightly 04/21/17 1130    04/22/17 1244  lorazepam injection 2 mg  (Med - Acute  Behavioral Management)      -- IM Every 4 hours PRN 04/22/17 1245    04/22/17 1244  haloperidol tablet 5 mg  (Med - Acute  Behavioral Management)      -- Oral Every 4 hours PRN 04/22/17 1245    04/22/17 1244  lorazepam tablet 2 mg  (Med - Acute  Behavioral Management)      -- Oral Every 4 hours PRN 04/22/17 1245    04/22/17 1244  haloperidol lactate injection 5 mg  (Med - Acute  Behavioral Management)      -- IM Every 4 hours PRN 04/22/17 1245           Review of Systems  Objective:     Vital Signs (Most Recent):  Temp: 97.9 °F (36.6 °C) (04/24/17 0800)  Pulse: 60 (04/24/17 0800)  Resp: 17 (04/24/17 0800)  BP: (!) 141/77 (04/24/17 0800) Vital Signs (24h Range):  Temp:  [97.9 °F (36.6 °C)-98.6 °F (37 °C)] 97.9 °F (36.6 °C)  Pulse:  [60-79] 60  Resp:  [16-17] 17  BP: (117-141)/(70-77) 141/77     Height: 6' 1" (185.4 cm)  Weight: 71.7 kg (158 lb 1.1 oz)  Body mass index is 20.85 kg/(m^2).    No intake or output data in the 24 hours ending 04/24/17 1022    Physical Exam     Significant Labs:   Last 72 Hours:   Recent Lab Results       04/23/17  1326 04/22/17  0558      Cholesterol  140  Comment:  The National Cholesterol Education Program (NCEP) has set the  following guidelines " "(reference ranges) for Cholesterol:  Optimal.....................<200 mg/dL  Borderline High.............200-239 mg/dL  High........................> or = 240 mg/dL       Folate  14.3     Free T4  0.92     HDL  46  Comment:  The National Cholesterol Education Program (NCEP) has set the  following guidelines (reference values) for HDL Cholesterol:  Low...............<40 mg/dL  Optimal...........>60 mg/dL       HDL/Chol Ratio  32.9     HIV Rapid Testing Negative      LDL Cholesterol  75.6  Comment:  The National Cholesterol Education Program (NCEP) has set the  following guidelines (reference values) for LDL Cholesterol:  Optimal.......................<130 mg/dL  Borderline High...............130-159 mg/dL  High..........................160-189 mg/dL  Very High.....................>190 mg/dL       Non-HDL Cholesterol  94  Comment:  Risk category and Non-HDL cholesterol goals:  Coronary heart disease (CHD)or equivalent (10-year risk of CHD >20%):  Non-HDL cholesterol goal     <130 mg/dL  Two or more CHD risk factors and 10-year risk of CHD <= 20%:  Non-HDL cholesterol goal     <160 mg/dL  0 to 1 CHD risk factor:  Non-HDL cholesterol goal     <190 mg/dL       T3, Total  78     Total Cholesterol/HDL Ratio  3.0     Triglycerides  92  Comment:  The National Cholesterol Education Program (NCEP) has set the  following guidelines (reference values) for triglycerides:  Normal......................<150 mg/dL  Borderline High.............150-199 mg/dL  High........................200-499 mg/dL       Vitamin B-12  686           Significant Imaging: None    Assessment/Plan:     * Opioid dependence with withdrawal  - history of Fentanyl and heroin use  - Continue opiate withdrawal medications   - Encourage residential substance abuse rehab following discharge   - patient is interested in "a 60 to 90 day program after my birthday"  - Added Lidocaine patch for 1 day for back pain that patient reports is usually masked by opiate " use  - Alternate Tylenol with ibuprofen (liver enzymes WNL)    Substance induced mood disorder  - Patient with significant history of substance abuse  - [facility] and was discharged on Depakote 500 mg PO BID and Gabapentin 400 mg PO q6 for anxiety. Patient reports he has been off medications for almost a year and Depakote level was undetectable on admission  - Continue Lexapro 10 mg PO daily, patient prefers not to increase dose at this time  - Continue Remeron 30 mg PO QHS  - Continue Gabapentin to 600 mg PO TID  - Continue Folbic    Alcohol dependence with uncomplicated withdrawal  - Patient with history of alcohol abuse  - Continue Valium taper, will decrease dose to Valium 5mg po BID    Polysubstance abuse  -Patient with history of opiate, benzodiazepine, alcohol, and THC use disorder. UTox + for opiates and THC on admission  - Reports last opiate use was at 7 PM before presenting to ER and last use of alcohol was at 3 PM before presenting to ER.  - Patient currently reporting opiate withdrawal symptoms with abdominal cramps and chills  - Continue opiate withdrawal medications including Bentyl, Robaxin, Imodium, Ibuprofen, Zofran, and Clonidine  -  on cessation and discuss potential rehab options  -Patient requested to be tested for HIV, Hep C, Syphilis, C/G   - Ordered labwork 4/24/17    Alcohol withdrawal  - Vital signs stable  - Continue Valium taper, decrease dose to 5mg po BID       Need for Continued Hospitalization:   Requires ongoing hospitalization for stabilization of medications.    Anticipated Disposition: Home or Self Care    Brannon Wiedemann, M.D.   [facility] Psychiatry PGY4  4/24/2017

## 2017-04-24 NOTE — PROGRESS NOTES
04/24/17 0900 04/24/17 1000 04/24/17 1100   [facility] Group Therapy   Group Name Community Reintegration Stress Management (guided imagery)   Specific Interventions Current Events Coping Skills Training --    Participation Level Active;Appropriate Active;Appropriate --    Participation Quality Cooperative Cooperative Refused   Insight/Motivation Good Good --    Affect/Mood Display Appropriate Appropriate --    Cognition Alert;Oriented Alert;Oriented --        04/24/17 1300   [facility] Group Therapy   Group Name Therapeutic Recreation   Specific Interventions --    Participation Level --    Participation Quality Drowsy;Refused   Insight/Motivation --    Affect/Mood Display --    Cognition --

## 2017-04-24 NOTE — ASSESSMENT & PLAN NOTE
- Patient with history of alcohol abuse  - Continue Valium taper, will decrease dose to Valium 5mg po BID

## 2017-04-24 NOTE — ASSESSMENT & PLAN NOTE
- Patient with significant history of substance abuse  - Previously admitted to [facility] in 6/2016 and was discharged on Depakote 500 mg PO BID and Gabapentin 400 mg PO q6 for anxiety. Patient reports he has been off medications for almost a year and Depakote level was undetectable on admission  - Continue Lexapro 10 mg PO daily, patient prefers not to increase dose at this time  - Continue Remeron 30 mg PO QHS  - Continue Gabapentin to 600 mg PO TID  - Continue Folbic

## 2017-04-24 NOTE — PSYCH
"Pt anxious, restless, and medication seeking this morning. Frequently asking this RN, "What else do I have that you can give me?" and asking this RN to "remind" him about the times that he may have PRN medications. Informed pt that he will be given medications as he has physical complaints. Pt complained of muscle cramping and was given robaxin. Pt then went to group and a peer complained that pt was bragging about his history of drug use.  Later, after pt went to treatment team meeting, pt began cursing in hallway loudly, punching wall and being disruptive and demanding to be given a shot. Pt was given PO benadryl, haldol, and ativan. Pt did calm down and was quieter and more cooperative.  Pt later requested clonidine for his "withdrawal symptoms" and was given clonidine- see MAR.Resting quietly in bed at this time. Denies SI/HI. Denies AH/VH. Medication compliant. Attended select groups. NAD observed. Will cont to monitor.  "

## 2017-04-25 PROCEDURE — 25000003 PHARM REV CODE 250: Performed by: PSYCHIATRY & NEUROLOGY

## 2017-04-25 PROCEDURE — 90853 GROUP PSYCHOTHERAPY: CPT | Mod: HP,S$PBB,, | Performed by: PSYCHOLOGIST

## 2017-04-25 PROCEDURE — 99233 SBSQ HOSP IP/OBS HIGH 50: CPT | Mod: AF,S$PBB,, | Performed by: PSYCHIATRY & NEUROLOGY

## 2017-04-25 PROCEDURE — 12400001 HC PSYCH SEMI-PRIVATE ROOM

## 2017-04-25 PROCEDURE — 25000003 PHARM REV CODE 250: Performed by: STUDENT IN AN ORGANIZED HEALTH CARE EDUCATION/TRAINING PROGRAM

## 2017-04-25 RX ORDER — DICYCLOMINE HYDROCHLORIDE 10 MG/1
10 CAPSULE ORAL 4 TIMES DAILY PRN
Status: DISCONTINUED | OUTPATIENT
Start: 2017-04-25 | End: 2017-04-26 | Stop reason: HOSPADM

## 2017-04-25 RX ORDER — DIAZEPAM 5 MG/1
5 TABLET ORAL NIGHTLY
Status: DISCONTINUED | OUTPATIENT
Start: 2017-04-25 | End: 2017-04-26 | Stop reason: HOSPADM

## 2017-04-25 RX ADMIN — GABAPENTIN 600 MG: 300 CAPSULE ORAL at 08:04

## 2017-04-25 RX ADMIN — METHOCARBAMOL 500 MG: 500 TABLET ORAL at 03:04

## 2017-04-25 RX ADMIN — IBUPROFEN 600 MG: 600 TABLET, FILM COATED ORAL at 03:04

## 2017-04-25 RX ADMIN — THERA TABS 1 TABLET: TAB at 08:04

## 2017-04-25 RX ADMIN — GABAPENTIN 600 MG: 300 CAPSULE ORAL at 05:04

## 2017-04-25 RX ADMIN — CLONIDINE HYDROCHLORIDE 0.1 MG: 0.1 TABLET ORAL at 03:04

## 2017-04-25 RX ADMIN — Medication 1 TABLET: at 08:04

## 2017-04-25 RX ADMIN — NICOTINE 1 PATCH: 14 PATCH, EXTENDED RELEASE TRANSDERMAL at 08:04

## 2017-04-25 RX ADMIN — METHOCARBAMOL 500 MG: 500 TABLET ORAL at 08:04

## 2017-04-25 RX ADMIN — DIAZEPAM 5 MG: 5 TABLET ORAL at 08:04

## 2017-04-25 RX ADMIN — THIAMINE HCL TAB 100 MG 100 MG: 100 TAB at 08:04

## 2017-04-25 RX ADMIN — HYDROXYZINE PAMOATE 50 MG: 50 CAPSULE ORAL at 11:04

## 2017-04-25 RX ADMIN — DICYCLOMINE HYDROCHLORIDE 10 MG: 10 CAPSULE ORAL at 12:04

## 2017-04-25 RX ADMIN — HYDROXYZINE PAMOATE 50 MG: 50 CAPSULE ORAL at 05:04

## 2017-04-25 RX ADMIN — MIRTAZAPINE 30 MG: 30 TABLET, FILM COATED ORAL at 08:04

## 2017-04-25 RX ADMIN — GABAPENTIN 600 MG: 300 CAPSULE ORAL at 12:04

## 2017-04-25 RX ADMIN — CLONIDINE HYDROCHLORIDE 0.1 MG: 0.1 TABLET ORAL at 08:04

## 2017-04-25 RX ADMIN — ESCITALOPRAM OXALATE 10 MG: 10 TABLET ORAL at 08:04

## 2017-04-25 RX ADMIN — DICYCLOMINE HYDROCHLORIDE 10 MG: 10 CAPSULE ORAL at 05:04

## 2017-04-25 NOTE — ASSESSMENT & PLAN NOTE
-Patient with history of opiate, benzodiazepine, alcohol, and THC use disorder. UTox + for opiates and THC on admission  - Reports last opiate use was at 7 PM before presenting to ER and last use of alcohol was at 3 PM before presenting to ER.  - Patient currently reporting opiate withdrawal symptoms with abdominal cramps and chills  - Continue opiate withdrawal medications including Bentyl, Robaxin, Imodium, Ibuprofen, Zofran, and Clonidine  -  on cessation and discuss potential rehab options  - HIV, Hep C, Syphilis, C/G- negative

## 2017-04-25 NOTE — ASSESSMENT & PLAN NOTE
- Patient with history of alcohol abuse  - Continue Valium taper, will decrease dose Valium 5mg po QHS

## 2017-04-25 NOTE — SUBJECTIVE & OBJECTIVE
"Interval History: Patient reports he is feeling better then yesterday. He perseverated on wanting to do an IOP before inpatient rehab. We provided patient education on substance abuse treatment. He complains about low dose nicotine patch. He states he woke up with "terrible anxiety" this morning. We normalized symptoms of anxiety. Patient was instructed not to act out for prn medication. Discussed healthier coping skills. He requests a family meeting with his girlfriend tomorrow. Remains argumentative regarding treatment. Denies suicidal ideation.      Psychotherapeutics     Start     Stop Route Frequency Ordered    04/21/17 1245  escitalopram oxalate tablet 10 mg      -- Oral Daily 04/21/17 1130    04/21/17 2100  mirtazapine tablet 30 mg      -- Oral Nightly 04/21/17 1130    04/22/17 1244  lorazepam injection 2 mg  (Med - Acute  Behavioral Management)      -- IM Every 4 hours PRN 04/22/17 1245    04/22/17 1244  haloperidol tablet 5 mg  (Med - Acute  Behavioral Management)      -- Oral Every 4 hours PRN 04/22/17 1245    04/22/17 1244  lorazepam tablet 2 mg  (Med - Acute  Behavioral Management)      -- Oral Every 4 hours PRN 04/22/17 1245    04/22/17 1244  haloperidol lactate injection 5 mg  (Med - Acute  Behavioral Management)      -- IM Every 4 hours PRN 04/22/17 1245    04/24/17 2100  diazePAM tablet 5 mg      -- Oral 2 times daily 04/24/17 1053           Review of Systems  Objective:     Vital Signs (Most Recent):  Temp: 97.7 °F (36.5 °C) (04/24/17 2000)  Pulse: 71 (04/24/17 2000)  Resp: 16 (04/24/17 2000)  BP: 133/76 (04/24/17 2000) Vital Signs (24h Range):  Temp:  [97.7 °F (36.5 °C)] 97.7 °F (36.5 °C)  Pulse:  [71-74] 71  Resp:  [16] 16  BP: (133-150)/(76-83) 133/76     Height: 6' 1" (185.4 cm)  Weight: 71.7 kg (158 lb 1.1 oz)  Body mass index is 20.85 kg/(m^2).    No intake or output data in the 24 hours ending 04/25/17 0800    Physical Exam     CONSTITUTIONAL  General Appearance: thin, long " "hair     MUSCULOSKELETAL  Muscle Strength and Tone: no weakness  Abnormal Involuntary Movements: no tremor   Gait and Station: ambulates without assistance      PSYCHIATRIC   Physical Exam  Appearance: appears stated age  Behavior/Cooperation/Attitude: cooperative, fidgety  Speech: conversational, monotone  Mood: "better"  Affect: righteous, argumentative  Thought Process: goal directed, linear  Thought Content: denies SI/HI/AVH  Sensorium: awake/alert  Orientation: oriented to person/place/situation  Attention/Memory: recent and remote intact  Calculation/Concentration: intact to conversation  Fund of Knowledge: intact to conversation  Abstraction: intact to conversation  Insight: fair  Judgment: limited   Impulse Control: poor  Reliability: poor     Significant Labs:   Last 72 Hours:   Recent Lab Results       04/23/17  1326      Hep A IgM Negative     Hep B C IgM Negative     Hepatitis B Surface Ag Negative     Hepatitis C Ab Negative     HIV Rapid Testing Negative     RPR Non-reactive           Significant Imaging: None  "

## 2017-04-25 NOTE — PROGRESS NOTES
04/24/17 2000   Activity/Group Therapy Checklist   Group Goals/Reflection   Attendance Attended   Follows Direction Followed directions   Group Interactions/Observations Interacted appropriately   Affect/Mood Range Normal range   Affect/Mood Display Appropriate

## 2017-04-25 NOTE — PROGRESS NOTES
04/25/17 0900 04/25/17 1000 04/25/17 1100   [facility] Group Therapy   Group Name Community Reintegration Leisure Skills Training  (Word Scramble) Stress Management   Specific Interventions Current Events Cognitive Stimulation Training Relaxation Training   Participation Level Active Active;Appropriate Appropriate   Participation Quality Social;Cooperative Restive Cooperative   Insight/Motivation Good Good Good   Affect/Mood Display Anxious;Restless Anxious Appropriate   Cognition Alert;Oriented Oriented Oriented;Alert       04/25/17 1300   [facility] Group Therapy   Group Name Therapeutic Recreation   Specific Interventions Cra\[facility] [place] [facility]\""   Participation Level None   Participation Quality --    Insight/Motivation --    Affect/Mood Display --    Cognition --

## 2017-04-25 NOTE — PSYCH
Pt somewhat less anxious and demanding than observed in previous shift, although pt continues to ask for medications frequently. Pt requested PRN robaxin, clonidine, ibuprofen,and vistaril PO -see MAR. Denies SI/HI. Denies AH/VH. Attended select groups. NAD observed. Will cont to monitor.

## 2017-04-25 NOTE — NURSING
"Pt's pacing hallway demanding " something for my nerve" Pt. informed nurse will check to see what is ordered, the pt stated, " F--- that man, I want a shot now, or I'm going to punch the f--- out of somebody !" Pt. began pacing the unit threatening staff. PRN medication given per Charge nurse  ADILENE Watters RN.  "

## 2017-04-25 NOTE — PROGRESS NOTES
Pt requesting referral packette send to [facility] [phone] [facility] admission. SW faxed will await for decision.

## 2017-04-25 NOTE — PROGRESS NOTES
SW and patient spoke with girlfriend to invite her to a family meeting on Wednesday at 10:00. She did accept to attend the meeting.

## 2017-04-25 NOTE — ASSESSMENT & PLAN NOTE
- Patient with significant history of substance abuse  - Continue Lexapro 10 mg PO daily, patient prefers not to increase dose at this time  - Continue Remeron 30 mg PO QHS  - Continue Gabapentin to 600 mg PO TID  - Continue Folbic

## 2017-04-25 NOTE — PROGRESS NOTES
Group Psychotherapy (PhD/LCSW)    Site: [facility]    Clinical status of patient: Inpatient    Date: 4/25/2017    Group Focus: Life Skills    Length of service: 38150 - 35-40 minutes    Number of patients in attendance: 5    Referred by: Acute Psychiatry Unit Treatment Team    Target symptoms: Alcohol Abuse, Substance Abuse and Mood Disorder    Patient's response to treatment: Active Listening and Self-disclosure    Progress toward goals: Progressing slowly    Interval History: Pt appeared alert and attentive in group. Pt participated appropriately in group discussion of substance abuse and recovery. Pt said that the discussion helped him see potential value of 12-step groups.     Diagnosis: See Dr Wiedemann's notes dated 4/25/17     Plan: Continue treatment on [facility]

## 2017-04-25 NOTE — ASSESSMENT & PLAN NOTE
- history of Fentanyl and heroin use  - Continue opiate withdrawal medications   - Encourage residential substance abuse rehab following discharge  - Added Lidocaine patch for 1 day for back pain that patient reports is usually masked by opiate use  - Alternate Tylenol with ibuprofen (liver enzymes WNL)

## 2017-04-25 NOTE — NURSING
Presently asleep in bed. Pt. slept  8 hours. Awakened for a.m. medications. No further acting out episodes noted.Continue to monitor.

## 2017-04-25 NOTE — PLAN OF CARE
Problem: Patient Care Overview (Adult)  Goal: Plan of Care Review  Outcome: Ongoing (interventions implemented as appropriate)  Observed  watching TV at the beginning of the shift. Denied SI/HI,A/V hallucinations. V/S stable .Pt's med seeking and becomes easily  irritable, agitated, and impulsive, not easily redirectable. PRN given per Charge nurse. Compliant with scheduled medications, attended evening group. Will continue to monitor.

## 2017-04-25 NOTE — PROGRESS NOTES
Pt is accepted to [facility] . Appointment scheduled Thursday 1:00 for assessment and information on program.  Pt was informed of appointment.

## 2017-04-25 NOTE — PROGRESS NOTES
[facility]  Psychiatry  Progress Note    Patient Name: Robert Batres  MRN: [id number]   Code Status: Full Code  Admission Date: 2017  Hospital Length of Stay: 4 days  Expected Discharge Date: 3 days  Attending Physician:Stuart  Primary Care Provider: Kirk Perez MD    Current Legal Status: [facility]    Patient information was obtained from patient and ER records.     Subjective:     Principal Problem:Opioid dependence with withdrawal    HPI: Chief Complaint:  Suicidal ideations       HPI: Per ED note, 22 yo pmhx polysubstance abuse, substance induced mood disorder, prior suicide attempts presenting with SI. Patient has been thinking about overdosing, has ideation of injecting large doses of fentanyl and heroin combined in hopes of killing himself. He reports researching the right amount to kill himself, 5000 mcg and 1000 mcg. He has purchased a larger needle to inject himself. Patient reports feeling more depressed over the past several days. He reports using fentanyl and heroin today, at 7 PM. Drinking vodka this afternoon, last drink was at 3PM. Denies HI. Denies hallucinations. Has attempted suicide in the past. Previously had inpatient psych hospitalizations.       On my initial evaluation, patient seen lying in bed asleep. Patient in no apparent distress and easily awoken for interview. Patient reports that he has been feeling depressed for the past couple of weeks. Reports that he lost a friend in March due to an accidental overdose on Heroin and Fentanyl. States that he told himself he would stop using after his friend  but states that his IV drug use has gotten even worse over the past few weeks. States that he feels guilty about his continued drug use and reports having suicidal ideations for past few weeks with plan to inject lethal amount of heroin and Fentanyl. Reports several previous suicide attempts by overdosing of Seroquel or Xanax or trying to cut himself. Patient  "complaining of withdrawal symptoms at this time with stomach cramps and chills. Reports last use of IV heroin was at 7 PM before coming into the ER. Patient describes his mood as depressed. Reports difficulty with sleeping. Poor appetite. Endorsing feelings of helplessness and hopelessness. Endorsing anhedonia. Continues to endorse passive SI at time of my interview. Denies having active SI at this time. No HI or AH/VH.          Per chart review, patient was hospitalized at [facility] inpatient psychiatric unit from 6/7/16-6/13/16 after presenting reporting SI and significant benzodiazepine abuse and desire to quit. Patient was diagnosed with SIMD, Benzodiazepine use disorder, Alcohol use disorder, THC use disorder and Personality Disorder NOS. Patient discharged on Depakote 500 Mg PO BID and Gabapentin 400 mg PO q6 for anxiety.      Patient with multiple similar presentations to ER. Most recently 3/14/17 when he presented with complaint of SI and stated that he wanted to get off opiates and benzos. Psychiatry was consulted however patient was largely uncooperative with interview. Patient reported that he had come to the hospital for detox and denied any SI. Patient was determined not to meet criteria for PEC and was discharged.          Hospital Course: 4/21/17: In addition to depressed mood, patient endorses daily heroin and fentanyl daily. He also drinks 3-4 alcoholic beverages daily. Will begin Valium 10mg po BID, Lexapro 10mg po daily and Remeron 30mg po QHS and continue prn medications for opioid withdrawal.   4/24/17: Patient engaged in acting out episodes over the weekend demanding prn medications, pacing the hallway, and punching the wall. Pt was also screaming that he wanted ativan and haldol, stating "if I really wanted it I could get it".  Pt punched the wall with his left hand and later c/o of hand pain. Pt was advised that if he continued with self harm behavior he would have to be put in restraints. Pt " "was told that his behavior was inappropriate. Pt was given oral haldol and benadryl to help him relax. Pt was redirected multiple times, he later admitted he was manipulative and apologized for his outburst. Pt soon asked for more haldol and benadryl after because he stated a high tolerance, request was denied."  4/25/17: Per chart review, continues to be med seeking and requesting shot rather than po medications. Pt was pacing hallway demanding " something for my nerve" Pt. informed nurse will check to see what is ordered, the pt stated, " F--- that man, I want a shot now, or I'm going to punch the f--- out of somebody !" Pt. began pacing the unit threatening staff.        Interval History: Patient reports he is feeling better then yesterday. He perseverated on wanting to do an IOP before inpatient rehab. We provided patient education on substance abuse treatment. He complains about low dose nicotine patch. He states he woke up with "terrible anxiety" this morning. We normalized symptoms of anxiety. Patient was instructed not to act out for prn medication. Discussed healthier coping skills. He requests a family meeting with his girlfriend tomorrow. Remains argumentative regarding treatment. Denies suicidal ideation.      Psychotherapeutics     Start     Stop Route Frequency Ordered    04/21/17 1245  escitalopram oxalate tablet 10 mg      -- Oral Daily 04/21/17 1130    04/21/17 2100  mirtazapine tablet 30 mg      -- Oral Nightly 04/21/17 1130    04/22/17 1244  lorazepam injection 2 mg  (Med - Acute  Behavioral Management)      -- IM Every 4 hours PRN 04/22/17 1245    04/22/17 1244  haloperidol tablet 5 mg  (Med - Acute  Behavioral Management)      -- Oral Every 4 hours PRN 04/22/17 1245    04/22/17 1244  lorazepam tablet 2 mg  (Med - Acute  Behavioral Management)      -- Oral Every 4 hours PRN 04/22/17 1245    04/22/17 1244  haloperidol lactate injection 5 mg  (Med - Acute  Behavioral Management)      -- IM Every 4 " "hours PRN 04/22/17 1245    04/24/17 2100  diazePAM tablet 5 mg      -- Oral 2 times daily 04/24/17 1053           Review of Systems  Objective:     Vital Signs (Most Recent):  Temp: 97.7 °F (36.5 °C) (04/24/17 2000)  Pulse: 71 (04/24/17 2000)  Resp: 16 (04/24/17 2000)  BP: 133/76 (04/24/17 2000) Vital Signs (24h Range):  Temp:  [97.7 °F (36.5 °C)] 97.7 °F (36.5 °C)  Pulse:  [71-74] 71  Resp:  [16] 16  BP: (133-150)/(76-83) 133/76     Height: 6' 1" (185.4 cm)  Weight: 71.7 kg (158 lb 1.1 oz)  Body mass index is 20.85 kg/(m^2).    No intake or output data in the 24 hours ending 04/25/17 0800    Physical Exam     CONSTITUTIONAL  General Appearance: thin, long hair     MUSCULOSKELETAL  Muscle Strength and Tone: no weakness  Abnormal Involuntary Movements: no tremor   Gait and Station: ambulates without assistance      PSYCHIATRIC   Physical Exam  Appearance: appears stated age  Behavior/Cooperation/Attitude: cooperative, fidgety  Speech: conversational, monotone  Mood: "better"  Affect: righteous, argumentative  Thought Process: goal directed, linear  Thought Content: denies SI/HI/AVH  Sensorium: awake/alert  Orientation: oriented to person/place/situation  Attention/Memory: recent and remote intact  Calculation/Concentration: intact to conversation  Fund of Knowledge: intact to conversation  Abstraction: intact to conversation  Insight: fair  Judgment: limited   Impulse Control: poor  Reliability: poor     Significant Labs:   Last 72 Hours:   Recent Lab Results       04/23/17  1326      Hep A IgM Negative     Hep B C IgM Negative     Hepatitis B Surface Ag Negative     Hepatitis C Ab Negative     HIV Rapid Testing Negative     RPR Non-reactive           Significant Imaging: None    Assessment/Plan:     * Opioid dependence with withdrawal  - history of Fentanyl and heroin use  - Continue opiate withdrawal medications   - Encourage residential substance abuse rehab following discharge  - Added Lidocaine patch for 1 day " for back pain that patient reports is usually masked by opiate use  - Alternate Tylenol with ibuprofen (liver enzymes WNL)    Substance induced mood disorder  - Patient with significant history of substance abuse  - Continue Lexapro 10 mg PO daily, patient prefers not to increase dose at this time  - Continue Remeron 30 mg PO QHS  - Continue Gabapentin to 600 mg PO TID  - Continue Folbic    Alcohol dependence with uncomplicated withdrawal  - Patient with history of alcohol abuse  - Continue Valium taper, will decrease dose Valium 5mg po QHS    Polysubstance abuse  -Patient with history of opiate, benzodiazepine, alcohol, and THC use disorder. UTox + for opiates and THC on admission  - Reports last opiate use was at 7 PM before presenting to ER and last use of alcohol was at 3 PM before presenting to ER.  - Patient currently reporting opiate withdrawal symptoms with abdominal cramps and chills  - Continue opiate withdrawal medications including Bentyl, Robaxin, Imodium, Ibuprofen, Zofran, and Clonidine  -  on cessation and discuss potential rehab options  - HIV, Hep C, Syphilis, C/G- negative       Alcohol withdrawal  - Vital signs stable  - Continue Valium taper, decrease dose to 5mg po BID       Need for Continued Hospitalization:   Requires ongoing hospitalization for stabilization of medications.    Anticipated Disposition: Home or Self Care    Brannon Wiedemann, M.D.   [facility] PGY4  4/25/2017

## 2017-04-26 VITALS
RESPIRATION RATE: 16 BRPM | BODY MASS INDEX: 20.95 KG/M2 | HEIGHT: 73 IN | DIASTOLIC BLOOD PRESSURE: 90 MMHG | TEMPERATURE: 100 F | WEIGHT: 158.06 LBS | SYSTOLIC BLOOD PRESSURE: 140 MMHG | HEART RATE: 70 BPM

## 2017-04-26 PROCEDURE — 25000003 PHARM REV CODE 250: Performed by: PSYCHIATRY & NEUROLOGY

## 2017-04-26 PROCEDURE — 99238 HOSP IP/OBS DSCHRG MGMT 30/<: CPT | Mod: AF,S$PBB,, | Performed by: PSYCHIATRY & NEUROLOGY

## 2017-04-26 PROCEDURE — 25000003 PHARM REV CODE 250: Performed by: STUDENT IN AN ORGANIZED HEALTH CARE EDUCATION/TRAINING PROGRAM

## 2017-04-26 RX ORDER — ESCITALOPRAM OXALATE 10 MG/1
10 TABLET ORAL DAILY
Qty: 30 TABLET | Refills: 0 | Status: SHIPPED | OUTPATIENT
Start: 2017-04-26 | End: 2018-11-25

## 2017-04-26 RX ORDER — ESCITALOPRAM OXALATE 10 MG/1
10 TABLET ORAL DAILY
Qty: 30 TABLET | Refills: 0 | Status: SHIPPED | OUTPATIENT
Start: 2017-04-26 | End: 2017-04-26

## 2017-04-26 RX ORDER — MIRTAZAPINE 30 MG/1
30 TABLET, FILM COATED ORAL NIGHTLY
Qty: 30 TABLET | Refills: 0 | Status: SHIPPED | OUTPATIENT
Start: 2017-04-26 | End: 2018-11-25

## 2017-04-26 RX ORDER — MIRTAZAPINE 30 MG/1
30 TABLET, FILM COATED ORAL NIGHTLY
Qty: 30 TABLET | Refills: 0 | Status: SHIPPED | OUTPATIENT
Start: 2017-04-26 | End: 2017-04-26

## 2017-04-26 RX ORDER — GABAPENTIN 300 MG/1
600 CAPSULE ORAL 3 TIMES DAILY
Qty: 90 CAPSULE | Refills: 0 | Status: SHIPPED | OUTPATIENT
Start: 2017-04-26 | End: 2018-11-25

## 2017-04-26 RX ORDER — GABAPENTIN 300 MG/1
600 CAPSULE ORAL 3 TIMES DAILY
Qty: 90 CAPSULE | Refills: 0 | Status: SHIPPED | OUTPATIENT
Start: 2017-04-26 | End: 2017-04-26

## 2017-04-26 RX ADMIN — Medication 1 TABLET: at 08:04

## 2017-04-26 RX ADMIN — THIAMINE HCL TAB 100 MG 100 MG: 100 TAB at 08:04

## 2017-04-26 RX ADMIN — GABAPENTIN 600 MG: 300 CAPSULE ORAL at 06:04

## 2017-04-26 RX ADMIN — NICOTINE 1 PATCH: 14 PATCH, EXTENDED RELEASE TRANSDERMAL at 08:04

## 2017-04-26 RX ADMIN — THERA TABS 1 TABLET: TAB at 08:04

## 2017-04-26 RX ADMIN — ESCITALOPRAM OXALATE 10 MG: 10 TABLET ORAL at 08:04

## 2017-04-26 RX ADMIN — METHOCARBAMOL 500 MG: 500 TABLET ORAL at 08:04

## 2017-04-26 NOTE — PROGRESS NOTES
Orders for discharge received.  Information on home medication and follow up care provided.  Verbalized understanding of discharge instructions.  Denies SI or HI.  All belongings gathered.  Escorted to exit by staff to his girlfriend.

## 2017-04-26 NOTE — DISCHARGE INSTRUCTIONS
H&P, D/C summary, Med. Rec., transmitted to next level of care.  Faxed discharge clinicals to [facility] for follow up on 4/27/17.

## 2017-04-26 NOTE — ASSESSMENT & PLAN NOTE
- Patient with significant history of substance abuse  - Continue Lexapro 10 mg PO daily, patient prefers not to increase dose at this time  - Continue Remeron 30 mg PO QHS  - Continue Gabapentin to 600 mg PO TID

## 2017-04-26 NOTE — ASSESSMENT & PLAN NOTE
- history of Fentanyl and heroin use  - opiate withdrawal medications were used to treat withdrawal, which has resolved  - Encourage residential substance abuse rehab following discharge

## 2017-04-26 NOTE — DISCHARGE SUMMARY
[facility]  Psychiatry  Discharge Summary      Patient Name: Robert Batres  MRN: [id number]  Admission Date: 2017  Hospital Length of Stay: 5 days  Discharge Date and Time:  2017 10:55 AM  Attending Physician: Mariana Vargas MD   Discharging Provider: Brannon E Wiedemann, MD  Primary Care Provider: Kirk Perez MD    HPI:   Chief Complaint:  Suicidal ideations       HPI: Per ED note, 22 yo pmhx polysubstance abuse, substance induced mood disorder, prior suicide attempts presenting with SI. Patient has been thinking about overdosing, has ideation of injecting large doses of fentanyl and heroin combined in hopes of killing himself. He reports researching the right amount to kill himself, 5000 mcg and 1000 mcg. He has purchased a larger needle to inject himself. Patient reports feeling more depressed over the past several days. He reports using fentanyl and heroin today, at 7 PM. Drinking vodka this afternoon, last drink was at 3PM. Denies HI. Denies hallucinations. Has attempted suicide in the past. Previously had inpatient psych hospitalizations.       On my initial evaluation, patient seen lying in bed asleep. Patient in no apparent distress and easily awoken for interview. Patient reports that he has been feeling depressed for the past couple of weeks. Reports that he lost a friend in March due to an accidental overdose on Heroin and Fentanyl. States that he told himself he would stop using after his friend  but states that his IV drug use has gotten even worse over the past few weeks. States that he feels guilty about his continued drug use and reports having suicidal ideations for past few weeks with plan to inject lethal amount of heroin and Fentanyl. Reports several previous suicide attempts by overdosing of Seroquel or Xanax or trying to cut himself. Patient complaining of withdrawal symptoms at this time with stomach cramps and chills. Reports last use of IV heroin  "was at 7 PM before coming into the ER. Patient describes his mood as depressed. Reports difficulty with sleeping. Poor appetite. Endorsing feelings of helplessness and hopelessness. Endorsing anhedonia. Continues to endorse passive SI at time of my interview. Denies having active SI at this time. No HI or AH/VH.          Per chart review, patient was hospitalized at [facility] inpatient psychiatric unit from 6/7/16-6/13/16 after presenting reporting SI and significant benzodiazepine abuse and desire to quit. Patient was diagnosed with SIMD, Benzodiazepine use disorder, Alcohol use disorder, THC use disorder and Personality Disorder NOS. Patient discharged on Depakote 500 Mg PO BID and Gabapentin 400 mg PO q6 for anxiety.      Patient with multiple similar presentations to ER. Most recently 3/14/17 when he presented with complaint of SI and stated that he wanted to get off opiates and benzos. Psychiatry was consulted however patient was largely uncooperative with interview. Patient reported that he had come to the hospital for detox and denied any SI. Patient was determined not to meet criteria for PEC and was discharged.          Hospital Course:   4/21/17: In addition to depressed mood, patient endorses daily heroin and fentanyl daily. He also drinks 3-4 alcoholic beverages daily. Will begin Valium 10mg po BID, Lexapro 10mg po daily and Remeron 30mg po QHS and continue prn medications for opioid withdrawal.   4/24/17: Patient engaged in acting out episodes over the weekend demanding prn medications, pacing the hallway, and punching the wall. Pt was also screaming that he wanted ativan and haldol, stating "if I really wanted it I could get it".  Pt punched the wall with his left hand and later c/o of hand pain. Pt was advised that if he continued with self harm behavior he would have to be put in restraints. Pt was told that his behavior was inappropriate. Pt was given oral haldol and benadryl to help him relax. Pt was " "redirected multiple times, he later admitted he was manipulative and apologized for his outburst. Pt soon asked for more haldol and benadryl after because he stated a high tolerance, request was denied."  4/25/17: Per chart review, continues to be med seeking and requesting shot rather than po medications. Pt was pacing hallway demanding " something for my nerve" Pt. informed nurse will check to see what is ordered, the pt stated, " F--- that man, I want a shot now, or I'm going to punch the f--- out of somebody !" Pt. began pacing the unit threatening staff.  4/26/17: Family meeting held with patients girlfriend, who seems like a stable source of support. She explains that the patient has suffered with depression off and on, even though periods of sobriety. She has known him for 2 years. Longest period of sobriety 6 months. Patient shows scars on his forearm from previous suicide attempts and girlfriend explains he built something to hang himself in the past. She agrees to have him return to live with her as long as he is enrolled in a treatment program. Today, patient denies suicidal ideation, plan or intent for self harm or harm to others. He is tolerating the medications without side effects. He states "It feels good to be alive." He engages appropriately in discharge plan, and agrees to follow up in a an [facility] rehab program. Girlfriend agrees to monitor him closely to prevent relapse before he enters the program tomorrow. Patient is stable for transition to outpatient substance abuse and psychiatric treatment.          * No surgery found *     Consults: no    Significant Labs:   Last 72 Hours:   Recent Lab Results       04/23/17  1326      Hep A IgM Negative     Hep B C IgM Negative     Hepatitis B Surface Ag Negative     Hepatitis C Ab Negative     HIV Rapid Testing Negative     RPR Non-reactive           Significant Imaging: None    Smoking Cessation:   Does the patient smoke? Yes  Does the patient want a " prescription for Smoking Cessation? No  Does the patient want counseling for Smoking Cessation? No     Alcohol Usage:   Have you expressed concern for patient unhealthy alcohol use? Yes  Have you provided feedback linking patients drinking to his/her health issue(s)?  yes  Has patient received education about recommended drinking limits?  yes  Has patient been advised to cut down drinking or abstain from drinking?  yes  Has patient been referred for treatment if indicated? yes       Pending Diagnostic Studies:     None        Final Active Diagnoses:    Diagnosis Date Noted POA    PRINCIPAL PROBLEM:  Opioid dependence with withdrawal [F11.23] 04/21/2017 Yes    Polysubstance abuse [F19.10] 04/21/2017 Yes    Alcohol withdrawal [F10.239] 04/21/2017 Yes    Alcohol dependence with uncomplicated withdrawal [F10.230] 06/08/2016 Yes     Chronic    Substance induced mood disorder [F19.94] 06/07/2016 Yes     Chronic      Problems Resolved During this Admission:    Diagnosis Date Noted Date Resolved POA      * Opioid dependence with withdrawal  - history of Fentanyl and heroin use  - opiate withdrawal medications were used to treat withdrawal, which has resolved  - Encourage residential substance abuse rehab following discharge      Substance induced mood disorder  - Patient with significant history of substance abuse  - Continue Lexapro 10 mg PO daily, patient prefers not to increase dose at this time  - Continue Remeron 30 mg PO QHS  - Continue Gabapentin to 600 mg PO TID      Alcohol dependence with uncomplicated withdrawal  - Patient with history of alcohol abuse  - Valium taper completed.     Polysubstance abuse  -Patient with history of opiate, benzodiazepine, alcohol, and THC use disorder. UTox + for opiates and THC on admission  - Counseled on cessation and discuss potential rehab options  - HIV, Hep C, Syphilis, C/G- negative       Alcohol withdrawal  - Vital signs stable  - Valium taper complete      Discharged  Condition: stable    Disposition: Home or Self Care    Follow Up:  Follow-up Information     Follow up with [facility] .    Why:  REPORT TO OFFICE FOR ADMISSION AND ASSESSMENT.ON THURSDAY 04/27/17.     Contact information:    [address] [place], [place]   PHONE: [phone]        Patient Instructions:     Diet general     Activity as tolerated     Call MD for:  temperature >100.4     Call MD for:  persistent nausea and vomiting or diarrhea       Medications:  Reconciled Home Medications:   Current Discharge Medication List      START taking these medications    Details   escitalopram oxalate (LEXAPRO) 10 MG tablet Take 1 tablet (10 mg total) by mouth once daily.  Qty: 30 tablet, Refills: 0         CONTINUE these medications which have CHANGED    Details   gabapentin (NEURONTIN) 300 MG capsule Take 2 capsules (600 mg total) by mouth 3 (three) times daily.  Qty: 90 capsule, Refills: 0      mirtazapine (REMERON) 30 MG tablet Take 1 tablet (30 mg total) by mouth every evening.  Qty: 30 tablet, Refills: 0         STOP taking these medications       diazepam (VALIUM) 5 MG tablet Comments:   Reason for Stopping:         divalproex (DEPAKOTE) 500 MG TbEC Comments:   Reason for Stopping:         divalproex (DEPAKOTE) 500 MG TbEC Comments:   Reason for Stopping:             Is patient being discharged on multiple neuroleptics? No    Time spent on the discharge of patient: 31 minutes    Brannon Wiedemann, M.D.   [facility] Psychiatry PGY4  4/26/2017

## 2017-04-26 NOTE — ASSESSMENT & PLAN NOTE
-Patient with history of opiate, benzodiazepine, alcohol, and THC use disorder. UTox + for opiates and THC on admission  - Counseled on cessation and discuss potential rehab options  - HIV, Hep C, Syphilis, C/G- negative

## 2017-04-26 NOTE — PLAN OF CARE
"Problem: Patient Care Overview (Adult)  Goal: Plan of Care Review  Outcome: Ongoing (interventions implemented as appropriate)  Observed ambulating unit.  Pt. stated, " I'm not going to act up because I want to go home tomorrow" . Denied SI/HI, A/V hallucinations. Pt. overheard yelling in the hallway. According to the female MHT,  the pt. began yelling at her because she told the pt that there was not anymore cereal left in the cabinet, the pt. insisted there was. Pt. redirected and was escorted to day area. Took scheduled medications and requested Vistaril.  Informed Vistaril was administered at 1758 and could not be given at that time. Pt's asleep in bed after 2200. No further agitation noted. Continue to monitor.      "

## 2017-04-26 NOTE — NURSING
Pt. asleep after 2200, slept throughout the night as noted upon frequent rounds. Awake and out of bed after 0600 for mediations. No further agitation noted. Continue to monitor.

## 2017-04-26 NOTE — PROGRESS NOTES
Pt's girlfriend attended family meeting with pt. And team members.  Discussed IOP on Thursday he will attend the program girlfriend will transport him daily . Will follow up with nellie HALL on Thursday.  Pt is discharge with girlfriend to her home.

## 2017-05-02 ENCOUNTER — HOSPITAL ENCOUNTER (EMERGENCY)
Facility: HOSPITAL | Age: 22
Discharge: HOME | End: 2017-05-02
Attending: EMERGENCY MEDICINE
Payer: MEDICAID

## 2017-05-02 VITALS
HEIGHT: 73 IN | TEMPERATURE: 99 F | WEIGHT: 160 LBS | RESPIRATION RATE: 18 BRPM | HEART RATE: 72 BPM | BODY MASS INDEX: 21.2 KG/M2 | OXYGEN SATURATION: 97 % | DIASTOLIC BLOOD PRESSURE: 72 MMHG | SYSTOLIC BLOOD PRESSURE: 118 MMHG

## 2017-05-02 DIAGNOSIS — F19.10 IV DRUG ABUSE: ICD-10-CM

## 2017-05-02 DIAGNOSIS — L03.114 CELLULITIS OF LEFT HAND: Primary | ICD-10-CM

## 2017-05-02 LAB
ALBUMIN SERPL BCP-MCNC: 4.1 G/DL
ALP SERPL-CCNC: 60 U/L
ALT SERPL W/O P-5'-P-CCNC: 19 U/L
ANION GAP SERPL CALC-SCNC: 9 MMOL/L
AST SERPL-CCNC: 23 U/L
BASOPHILS # BLD AUTO: 0.02 K/UL
BASOPHILS NFR BLD: 0.2 %
BILIRUB SERPL-MCNC: 0.3 MG/DL
BUN SERPL-MCNC: 9 MG/DL
CALCIUM SERPL-MCNC: 9.3 MG/DL
CHLORIDE SERPL-SCNC: 104 MMOL/L
CO2 SERPL-SCNC: 26 MMOL/L
CREAT SERPL-MCNC: 0.9 MG/DL
DIFFERENTIAL METHOD: ABNORMAL
EOSINOPHIL # BLD AUTO: 0.1 K/UL
EOSINOPHIL NFR BLD: 1.1 %
ERYTHROCYTE [DISTWIDTH] IN BLOOD BY AUTOMATED COUNT: 13.3 %
EST. GFR  (AFRICAN AMERICAN): >60 ML/MIN/1.73 M^2
EST. GFR  (NON AFRICAN AMERICAN): >60 ML/MIN/1.73 M^2
GLUCOSE SERPL-MCNC: 111 MG/DL
HCT VFR BLD AUTO: 41.2 %
HGB BLD-MCNC: 14 G/DL
LYMPHOCYTES # BLD AUTO: 1.5 K/UL
LYMPHOCYTES NFR BLD: 17.6 %
MCH RBC QN AUTO: 32 PG
MCHC RBC AUTO-ENTMCNC: 34 %
MCV RBC AUTO: 94 FL
MONOCYTES # BLD AUTO: 0.8 K/UL
MONOCYTES NFR BLD: 9.7 %
NEUTROPHILS # BLD AUTO: 6.1 K/UL
NEUTROPHILS NFR BLD: 71.4 %
PLATELET # BLD AUTO: 237 K/UL
PMV BLD AUTO: 10.3 FL
POTASSIUM SERPL-SCNC: 4 MMOL/L
PROT SERPL-MCNC: 6.9 G/DL
RBC # BLD AUTO: 4.38 M/UL
SODIUM SERPL-SCNC: 139 MMOL/L
WBC # BLD AUTO: 8.54 K/UL

## 2017-05-02 PROCEDURE — 96365 THER/PROPH/DIAG IV INF INIT: CPT

## 2017-05-02 PROCEDURE — 80053 COMPREHEN METABOLIC PANEL: CPT

## 2017-05-02 PROCEDURE — 85025 COMPLETE CBC W/AUTO DIFF WBC: CPT

## 2017-05-02 PROCEDURE — 99283 EMERGENCY DEPT VISIT LOW MDM: CPT | Mod: 25

## 2017-05-02 PROCEDURE — 25000003 PHARM REV CODE 250: Performed by: NURSE PRACTITIONER

## 2017-05-02 RX ORDER — CLINDAMYCIN HYDROCHLORIDE 150 MG/1
300 CAPSULE ORAL 4 TIMES DAILY
Qty: 56 CAPSULE | Refills: 0 | Status: SHIPPED | OUTPATIENT
Start: 2017-05-02 | End: 2017-05-08

## 2017-05-02 RX ORDER — CLINDAMYCIN PHOSPHATE 600 MG/50ML
600 INJECTION, SOLUTION INTRAVENOUS
Status: COMPLETED | OUTPATIENT
Start: 2017-05-02 | End: 2017-05-02

## 2017-05-02 RX ADMIN — CLINDAMYCIN IN 5 PERCENT DEXTROSE 600 MG: 12 INJECTION, SOLUTION INTRAVENOUS at 11:05

## 2017-05-02 RX ADMIN — SODIUM CHLORIDE 1000 ML: 0.9 INJECTION, SOLUTION INTRAVENOUS at 11:05

## 2017-05-02 NOTE — ED NOTES
Injecting heroin 1 day ago into left hand, swelling/redness and pain started yesterday and increased today.  Full ROM to left fingers and left wrist but swelling around left wrist makes movement painful.  Cap refill < 2 sec, nailbeds pink.  Radial/ulnar pulses palpable

## 2017-05-02 NOTE — ED PROVIDER NOTES
"Encounter Date: 5/2/2017       History     Chief Complaint   Patient presents with    Hand Problem     c/o redness and swelling to left hand since yesterday. Pt last injected drugs into vein yesterday     Review of patient's allergies indicates:  No Known Allergies  HPI Comments: 22yo male with pmhx of iv drug use, anxiety, MRSA, and psychiatric hospitalization is here for left hand pain and redness.  Pt reports he injected heroin into his left hand yesterday and noticed redness and swelling to the hand afterwards.  Pt reports he had to really "dig around" in his hand when attempting to find the vein.  He reports the needle was removed intact.  Pt also reports that he "self-medicates" his back pain with Suboxone, last dose today about 2-3 hours PTA.  Pt denies fever, weakness, numbness/tingling, and vomiting.  He has tried nothing for his symptoms.  No previous hospitalization for cellulitis.      Patient is a 21 y.o. male presenting with the following complaint: rash. The history is provided by the patient (Girlfriend).   Rash    This is a new problem. The current episode started yesterday. The problem has been gradually worsening. Associated with: IV drug use. Affected Location: Left hand. The pain is at a severity of 10/10. The pain has been worsening since onset. Associated symptoms include pain. Pertinent negatives include no blisters, no itching and no weeping. He has tried nothing for the symptoms. The treatment provided no relief. Risk factors: History of MRSA.     Past Medical History:   Diagnosis Date    ADHD (attention deficit hyperactivity disorder)     Anxiety     Anxiety     Benzodiazepine abuse     Bipolar disorder     Depression     History of psychiatric hospitalization     Hx of psychiatric care     Panic attack     Psychiatric problem     Serotonin syndrome     Substance abuse     Suicide attempt      History reviewed. No pertinent surgical history.  History reviewed. No pertinent " family history.  Social History   Substance Use Topics    Smoking status: Current Every Day Smoker     Types: Pipe    Smokeless tobacco: Never Used    Alcohol use Yes     Review of Systems   Constitutional: Negative for fever.   HENT: Negative for congestion and trouble swallowing.    Respiratory: Negative for cough.    Cardiovascular: Negative for chest pain.   Gastrointestinal: Negative for vomiting.   Musculoskeletal: Negative for joint swelling and neck pain.   Skin: Positive for rash. Negative for itching.   Allergic/Immunologic: Negative for immunocompromised state.   Neurological: Negative for weakness and headaches.   Psychiatric/Behavioral: Negative for confusion. The patient is nervous/anxious.    All other systems reviewed and are negative.      Physical Exam   Initial Vitals   BP Pulse Resp Temp SpO2   05/02/17 0848 05/02/17 0848 05/02/17 0848 05/02/17 0848 05/02/17 0848   129/81 94 18 98.7 °F (37.1 °C) 97 %     Physical Exam    Nursing note and vitals reviewed.  Constitutional: Vital signs are normal. He appears well-developed and well-nourished. He is active and cooperative.  Non-toxic appearance. He does not have a sickly appearance. He does not appear ill. No distress.   HENT:   Head: Normocephalic and atraumatic.   Eyes: Conjunctivae are normal.   Neck: Normal range of motion.   Cardiovascular: Normal rate, regular rhythm and normal heart sounds.   Pulses:       Radial pulses are 2+ on the right side, and 2+ on the left side.   Cap refill left finger tips <2 seconds.    Pulmonary/Chest: Effort normal and breath sounds normal.   Abdominal: Soft. Normal appearance and bowel sounds are normal. There is no tenderness.   Musculoskeletal:        Left hand: He exhibits swelling. He exhibits normal range of motion, no tenderness, no bony tenderness, normal two-point discrimination, normal capillary refill, no deformity and no laceration. Normal sensation noted. Normal strength noted.   Lymphadenopathy:      He has no axillary adenopathy.        Left: No epitrochlear adenopathy present.   Neurological: He is alert and oriented to person, place, and time. GCS eye subscore is 4. GCS verbal subscore is 5. GCS motor subscore is 6.   Skin: Skin is warm, dry and intact. No rash noted. There is erythema (left hand).   Psychiatric: He has a normal mood and affect. His speech is normal and behavior is normal. Judgment and thought content normal. Cognition and memory are normal.                 ED Course   Procedures  Labs Reviewed   CBC W/ AUTO DIFFERENTIAL - Abnormal; Notable for the following:        Result Value    RBC 4.38 (*)     MCH 32.0 (*)     Lymph% 17.6 (*)     All other components within normal limits   COMPREHENSIVE METABOLIC PANEL - Abnormal; Notable for the following:     Glucose 111 (*)     All other components within normal limits             Medical Decision Making:   History:   I obtained history from: someone other than patient.       <> Summary of History: Pt and girlfriend  Initial Assessment:   20yo male with history of IV drug use is here for left hand pain, erythema, warmth, and swelling since injecting yesterday.  Denies fever, numbness/tingling, vomiting.  Pt appears well, nontoxic. Vitals stable, afebrile.   Left hand erythema and TTP over dorsal surface.  Full nonpainful AROM.  No bony tenderness.  Cap refill <2 seconds in all finger tips.  No epitrochlear or axillary lymphadenopathy.    Differential Diagnosis:   Cellulitis, tenosynitis, allergic reaction  Clinical Tests:   Lab Tests: Ordered and Reviewed  ED Management:  Labs, IV fluids, IV clindamycin  WBC normal.  Pt adamantly refuses admission or observation.  I feel the pt has cellulitis due to IV drug use.  Advise DC drug use.  Pt has a plan to seek addiction counseling and his girlfriend ensures monitoring the site.  Advised strict return precautions and girlfriend and pt verbalized understanding, compliance, and agreement with treatment  plan.  RX Clindamycin.                Attending Attestation:     Physician Attestation Statement for NP/PA:   I discussed this assessment and plan of this patient with the NP/PA, but I did not personally examine the patient. The face to face encounter was performed by the NP/PA.                  ED Course     Clinical Impression:   The primary encounter diagnosis was Cellulitis of left hand. A diagnosis of IV drug abuse was also pertinent to this visit.          BOBBY Chua  05/02/17 1548       Bernard Barr MD  05/02/17 4539

## 2017-05-02 NOTE — ED AVS SNAPSHOT
[facility] [address]  [place] [address]               Robert Batres   2017 10:43 AM   ED    Description:  Male : 1995   Department:  [facility]           Your Care was Coordinated By:     Provider Role From To    Bernard Barr MD Attending Provider 17 1100 --    BOBBY Chua Nurse Practitioner 17 1044 --      Reason for Visit     Hand Problem           Diagnoses this Visit        Comments    Cellulitis of left hand    -  Primary     IV drug abuse           ED Disposition     None           To Do List           Follow-up Information     Follow up with Kirk Perez MD. Schedule an appointment as soon as possible for a visit in 2 days.    Specialty:  Family Medicine    Contact information:    [address] [facility] [address] [facility] [address]  [place] [address]  [phone]         These Medications        Disp Refills Start End    clindamycin (CLEOCIN) 150 MG capsule 56 capsule 0 2017    Take 2 capsules (300 mg total) by mouth 4 (four) times daily. - Oral      [facility]     [facility] Nurse Care Line -  Assistance  Unless otherwise directed by your provider, please contact [facility], our nurse care line that is available for  assistance.     Registered nurses in the [facility] provide: appointment scheduling, clinical advisement, health education, and other advisory services.  Call: [phone] (toll free)               Medications           Message regarding Medications     Verify the changes and/or additions to your medication regime listed below are the same as discussed with your clinician today.  If any of these changes or additions are incorrect, please notify your healthcare provider.        START taking these NEW medications        Refills    clindamycin (CLEOCIN) 150 MG capsule 0    Sig: Take 2 capsules (300 mg total) by mouth 4 (four) times daily.     "Class: Print    Route: Oral      These medications were administered today        Dose Freq    sodium chloride 0.9% bolus 1,000 mL 1,000 mL ED 1 Time    Sig: Inject 1,000 mLs into the vein ED 1 Time.    Class: Normal    Route: Intravenous    clindamycin 600 MG/50 ML D5W 600 mg/50 mL IVPB 600 mg 600 mg ED 1 Time    Sig: Inject 50 mLs (600 mg total) into the vein ED 1 Time.    Class: Normal    Route: Intravenous           Verify that the below list of medications is an accurate representation of the medications you are currently taking.  If none reported, the list may be blank. If incorrect, please contact your healthcare provider. Carry this list with you in case of emergency.           Current Medications     clindamycin (CLEOCIN) 150 MG capsule Take 2 capsules (300 mg total) by mouth 4 (four) times daily.    escitalopram oxalate (LEXAPRO) 10 MG tablet Take 1 tablet (10 mg total) by mouth once daily.    gabapentin (NEURONTIN) 300 MG capsule Take 2 capsules (600 mg total) by mouth 3 (three) times daily.    mirtazapine (REMERON) 30 MG tablet Take 1 tablet (30 mg total) by mouth every evening.           Clinical Reference Information           Your Vitals Were     BP Pulse Temp Resp Height Weight    129/81 (BP Location: Right arm, Patient Position: Sitting) 94 98.7 °F (37.1 °C) (Oral) 18 6' 1" (1.854 m) 72.6 kg (160 lb)    SpO2 BMI             97% 21.11 kg/m2         Allergies as of 5/2/2017     No Known Allergies      Immunizations Administered on Date of Encounter - 5/2/2017     None      ED Micro, Lab, POCT     Start Ordered       Status Ordering Provider    05/02/17 1110 05/02/17 1109  CBC auto differential  STAT      Final result     05/02/17 1110 05/02/17 1109  Comprehensive metabolic panel  STAT      Final result       ED Imaging Orders     None        Discharge Instructions         Discharge Instructions for Cellulitis  You have been diagnosed with cellulitis. This is an infection in the deepest layer of the " skin. In some cases, the infection also affects the muscle. Cellulitis is caused by bacteria. The bacteria can enter the body through broken skin. This can happen with a cut, scratch, animal bite, or an insect bite that has been scratched. You may have been treated in the hospital with antibiotics and fluids. You will likely be given a prescription for antibiotics to take at home. This sheet will help you take care of yourself at home.  Home care  When you are home:  · Take the prescribed antibiotic medicine you are given as directed until it is gone. Take it even if you feel better. It treats the infection and stops it from returning. Not taking all the medicine can make future infections hard to treat.  · Keep the infected area clean.  · When possible, raise the infected area above the level of your heart. This helps keep swelling down.  · Talk with your healthcare provider if you are in pain. Ask what kind of over-the-counter medicine you can take for pain.  · Apply clean bandages as advised.  · Take your temperature once a day for a week.  · Wash your hands often to prevent spreading the infection.  In the future, wash your hands before and after you touch cuts, scratches, or bandages. This will help prevent infection.   When to call your healthcare provider  Call your healthcare provider immediately if you have any of the following:  · Difficulty or pain when moving the joints above or below the infected area  · Discharge or pus draining from the area  · Fever of 100.4°F (38°C) or higher, or as directed by your healthcare provider  · Pain that gets worse in or around the infected   · Redness that gets worse in or around the infected area, particularly if the area of redness expands to a wider area  · Shaking chills  · Swelling of the infected area  · Vomiting   Date Last Reviewed: 8/1/2016  © 4794-8346 [employer]. [address] [place] [address], [place] [address]. All rights reserved. This information is  not intended as a substitute for professional medical care. Always follow your healthcare professional's instructions.          Drug Abuse  Use and abuse of drugs like marijuana, amphetamines (speed, crank), cocaine, heroin or prescription pain medicines, sedatives and sleeping pills, MDMA, ecstasy, bath salts, PCP, mescaline and LSD may lead to addiction or dependence. Once this happens, you are at greater risk for any of the following:  Social and personal problems  · Craving for the drug and not able to stop using even though you think you want to stop (psychological addiction)  · Drug withdrawal symptoms if you stop taking the drug (physical dependence)  · Loss of job or your family  · Arrest, conviction, and alf sentence for possession of an illegal substance or for driving under the influence  Health problems  · Strokes, heart attacks, kidney failure  · Accidental injuries to yourself or others while you are under the influence of the drug (in a car or at home)  · HIV infection (much greater risk if you use IV drugs)  · Skin infections  · Other sexually transmitted disease (STDs such as herpes, chlamydia, gonorrhea, and others)  · Severe and fatal infection of the heart valves (if you use IV drugs)  · Hepatitis B or C  · Death from overdose  Home care  The following suggestions can help you care for yourself at home:  · Admit you have a drug problem. Ask for help from your family and close friends.  · Seek professional help. This could be in the form of individual psychotherapy or counseling. There are also outpatient, inpatient, and residential drug treatment programs.  · Join a self-help group for drug abuse.  · Stay away from friends who abuse drugs or tempt you to continue abusing drugs.  · Eat a balanced diet and start a regular exercise program.  Follow-up care  Follow up with your healthcare provider, or as advised. Contact one of the resources below for help:  · National [place] on Alcoholism and Drug  Dependence  www.ncadd.org  657.635.8914  · Narcotics Anonymous  www.na.org  802.280.4900  · National Alcohol and Substance Abuse Information Center (for referral to treatment programs)  www.DAVIDsTEA  510.567.9459  Call 911  Call 911 if any of the following occur:  · Seizure  · Hard time breathing or slow, irregular breathing  · Chest pain  · Sudden weakness on one side of your body or sudden trouble speaking  · Very drowsy or trouble awakening  · Fainting or loss of consciousness  · Rapid heart rate  · Very slow heart rate  When to seek medical advice  Call your healthcare provider right away if any of these occur:  · Agitation, anxiety, unable to sleep  · Unintended weight loss (more than 10 to 15 pounds over 3 months)  · Fever of 100.4°F (38°C) or higher, or as directed by your healthcare provider  · Shortness of breath  · Cough with colored sputum  · Redness, swelling, or tenderness at an injection site  Date Last Reviewed: 6/1/2016 © 2000-2016 [employer]. [address] [place] [address]. All rights reserved. This information is not intended as a substitute for professional medical care. Always follow your healthcare professional's instructions.          Treating Drug Abuse and Addiction  Treatment for addiction to drugs varies with your needs. Some people go through treatment only once. Others return to it off and on throughout their lives.    Recovery is a lifelong process  Recovery begins when you seek help for your drug abuse or addiction. Then, youll slowly start to build a new life. It may not always be easy. But with the support of others, you can succeed. During recovery, youll go through three stages. How long each one lasts varies with each person.  Early recovery  During this stage, youll focus on stopping your drug abuse or addiction. Most likely, youll receive help from a therapist, addiction counselor, or doctor. You may also go to self-help groups on a  regular basis. Youll avoid people or places that might tempt you to use drugs.  Middle recovery  During this time, youll work on changing your life. You may change your values, move, or go back to school. You might start new, healthy relationships. And you might end ones that arent as healthy. You may even try to make up for harm you caused others while using drugs.  Late recovery  This stage will last for the rest of your life. Youre feeling stronger and healthier. Now, you may look for a greater sense of purpose. You may focus on the things that matter to you most. These may include your family, your beliefs, or lending a hand to others.  Types of drug treatment  · Residential treatment. You live in a drug-free setting with others who have the same problem. Often, your stay in community residential treatment lasts about a month, but it could last up to 6 months. During this time, you see a therapist or addiction counselor.  · Outpatient therapy. You see a therapist, or addiction counselor while living your normal life. You may see your therapist by yourself. Or you may be part of a group. In some cases, your family may see your therapist too.  · Self-help groups. These offer you support and encouragement. There are also support groups for the loved ones of people addicted to drugs.  · Medications. Your treatment may include certain medications, such as methadone, disulfiram, buprenorphine, or naltrexone.  · Alternative treatments. These may include acupuncture, hypnosis, or biofeedback. Ask your health care provider about them.  When times get tough  Drug addiction is never really cured. Sometimes, no matter how well youre doing, you may be tempted. If so, you can:  · Call your sponsor. This is someone in your self-help group who watches out for you.  · Talk to your therapist, health care provider, or someone else you trust.  · Make a list of how much youve achieved.  · Find something to distract you. Go to  a movie, go out for a walk, or call a friend.  Date Last Reviewed: 11/18/2014  © 9198-2052 [employer]. [address] [place] [address], [place] [address]. All rights reserved. This information is not intended as a substitute for professional medical care. Always follow your healthcare professional's instructions.          Understanding Heroin Abuse and Addiction  The personal cost of using heroin can be devastating. You may lose your job, your savings, even your friends and family. In time, you may lose your health or even your life. But heroin abuse and addiction can be treated. If you or a loved one has a drug problem, there is help. The first step is to admit the problem, then talk to someone you trust.    What is heroin?  Heroin is an illegal drug made from certain types of poppies. It's often sold as a powder or a black, tarry substance. Users may inject, smoke, sniff, or even eat the drug. This produces a feeling of intense pleasure that may last for several hours. Heroin is highly addictive. This means your body and mind develop a strong need for it. As a result, you spend more and more time seeking and using the drug. In fact, using heroin can become the main purpose of your life. And you may not be able to give it up on your own.  What are the risks of heroin use?  Addiction is one of the biggest risks of trying heroin. Other risks include:  · Exposure to HIV and hepatitis. Using shared needles to inject the drug can spread these diseases which can affect your health. And you may pass them on to your sexual partners and children.  · Overdose or death. The heroin you use may be stronger than you think. This can lead to an overdose. In some cases, too much heroin can be fatal.  · Early or stillbirths. Babies born to women using heroin may be born early. They also may not survive birth. Or, they may be born addicted to heroin.  · Scarred or collapsed veins, or infections of the veins or skin  · Lung  disease, such as pneumonia or tuberculosis  · Clogged blood vessels  · Damage to your brain or heart  Date Last Reviewed: 11/17/2014  © 7525-1227 [employer]. [address] [place] [address], [place] [address]. All rights reserved. This information is not intended as a substitute for professional medical care. Always follow your healthcare professional's instructions.          [facility] Sign-Up     Activating your [facility] account is as easy as 1-2-3!     1) Visit [email or web address], select Sign Up Now, enter this activation code and your date of birth, then select Next.  [email or web address]  Expires: 6/16/2017 12:17 PM      2) Create a username and password to use when you visit [facility] in the future and select a security question in case you lose your password and select Next.    3) Enter your e-mail address and click Sign Up!    Additional Information  If you have questions, please e-mail [email or web address] or call [phone] to talk to our [facility] staff. Remember, [facility] is NOT to be used for urgent needs. For medical emergencies, dial 911.          [facility] complies with applicable Federal civil rights laws and does not discriminate on the basis of race, color, national origin, age, disability, or sex.        Language Assistance Services     ATTENTION: Language assistance services are available, free of charge. Please call [phone].      ATENCIÓN: Si habla español, tiene a schwab disposición servicios gratuitos de asistencia lingüística. Llame al [phone].     CHÚ Ý: N?u b?n nói Ti?ng Vi?t, có các d?ch v? h? tr? ngôn ng? mi?n phí dành cho b?n. G?i s? [phone].

## 2017-05-02 NOTE — DISCHARGE INSTRUCTIONS
Discharge Instructions for Cellulitis  You have been diagnosed with cellulitis. This is an infection in the deepest layer of the skin. In some cases, the infection also affects the muscle. Cellulitis is caused by bacteria. The bacteria can enter the body through broken skin. This can happen with a cut, scratch, animal bite, or an insect bite that has been scratched. You may have been treated in the hospital with antibiotics and fluids. You will likely be given a prescription for antibiotics to take at home. This sheet will help you take care of yourself at home.  Home care  When you are home:  · Take the prescribed antibiotic medicine you are given as directed until it is gone. Take it even if you feel better. It treats the infection and stops it from returning. Not taking all the medicine can make future infections hard to treat.  · Keep the infected area clean.  · When possible, raise the infected area above the level of your heart. This helps keep swelling down.  · Talk with your healthcare provider if you are in pain. Ask what kind of over-the-counter medicine you can take for pain.  · Apply clean bandages as advised.  · Take your temperature once a day for a week.  · Wash your hands often to prevent spreading the infection.  In the future, wash your hands before and after you touch cuts, scratches, or bandages. This will help prevent infection.   When to call your healthcare provider  Call your healthcare provider immediately if you have any of the following:  · Difficulty or pain when moving the joints above or below the infected area  · Discharge or pus draining from the area  · Fever of 100.4°F (38°C) or higher, or as directed by your healthcare provider  · Pain that gets worse in or around the infected   · Redness that gets worse in or around the infected area, particularly if the area of redness expands to a wider area  · Shaking chills  · Swelling of the infected area  · Vomiting   Date Last Reviewed:  8/1/2016 © 2000-2016 [employer]. [address] [place] [address], [place] [address]. All rights reserved. This information is not intended as a substitute for professional medical care. Always follow your healthcare professional's instructions.          Drug Abuse  Use and abuse of drugs like marijuana, amphetamines (speed, crank), cocaine, heroin or prescription pain medicines, sedatives and sleeping pills, MDMA, ecstasy, bath salts, PCP, mescaline and LSD may lead to addiction or dependence. Once this happens, you are at greater risk for any of the following:  Social and personal problems  · Craving for the drug and not able to stop using even though you think you want to stop (psychological addiction)  · Drug withdrawal symptoms if you stop taking the drug (physical dependence)  · Loss of job or your family  · Arrest, conviction, and skilled nursing sentence for possession of an illegal substance or for driving under the influence  Health problems  · Strokes, heart attacks, kidney failure  · Accidental injuries to yourself or others while you are under the influence of the drug (in a car or at home)  · HIV infection (much greater risk if you use IV drugs)  · Skin infections  · Other sexually transmitted disease (STDs such as herpes, chlamydia, gonorrhea, and others)  · Severe and fatal infection of the heart valves (if you use IV drugs)  · Hepatitis B or C  · Death from overdose  Home care  The following suggestions can help you care for yourself at home:  · Admit you have a drug problem. Ask for help from your family and close friends.  · Seek professional help. This could be in the form of individual psychotherapy or counseling. There are also outpatient, inpatient, and residential drug treatment programs.  · Join a self-help group for drug abuse.  · Stay away from friends who abuse drugs or tempt you to continue abusing drugs.  · Eat a balanced diet and start a regular exercise program.  Follow-up care  Follow up  with your healthcare provider, or as advised. Contact one of the resources below for help:  · National [place] on Alcoholism and Drug Dependence  www.ncadd.org  655.424.9687  · Narcotics Anonymous  www.na.org  195.974.2231  · National Alcohol and Substance Abuse Information Center (for referral to treatment programs)  www.KidsLink  841.833.6396  Call 911  Call 911 if any of the following occur:  · Seizure  · Hard time breathing or slow, irregular breathing  · Chest pain  · Sudden weakness on one side of your body or sudden trouble speaking  · Very drowsy or trouble awakening  · Fainting or loss of consciousness  · Rapid heart rate  · Very slow heart rate  When to seek medical advice  Call your healthcare provider right away if any of these occur:  · Agitation, anxiety, unable to sleep  · Unintended weight loss (more than 10 to 15 pounds over 3 months)  · Fever of 100.4°F (38°C) or higher, or as directed by your healthcare provider  · Shortness of breath  · Cough with colored sputum  · Redness, swelling, or tenderness at an injection site  Date Last Reviewed: 6/1/2016  © 6700-4743 [employer]. [address] [place] [address]. All rights reserved. This information is not intended as a substitute for professional medical care. Always follow your healthcare professional's instructions.          Treating Drug Abuse and Addiction  Treatment for addiction to drugs varies with your needs. Some people go through treatment only once. Others return to it off and on throughout their lives.    Recovery is a lifelong process  Recovery begins when you seek help for your drug abuse or addiction. Then, youll slowly start to build a new life. It may not always be easy. But with the support of others, you can succeed. During recovery, youll go through three stages. How long each one lasts varies with each person.  Early recovery  During this stage, youll focus on stopping your drug abuse or  addiction. Most likely, youll receive help from a therapist, addiction counselor, or doctor. You may also go to self-help groups on a regular basis. Youll avoid people or places that might tempt you to use drugs.  Middle recovery  During this time, youll work on changing your life. You may change your values, move, or go back to school. You might start new, healthy relationships. And you might end ones that arent as healthy. You may even try to make up for harm you caused others while using drugs.  Late recovery  This stage will last for the rest of your life. Youre feeling stronger and healthier. Now, you may look for a greater sense of purpose. You may focus on the things that matter to you most. These may include your family, your beliefs, or lending a hand to others.  Types of drug treatment  · Residential treatment. You live in a drug-free setting with others who have the same problem. Often, your stay in community residential treatment lasts about a month, but it could last up to 6 months. During this time, you see a therapist or addiction counselor.  · Outpatient therapy. You see a therapist, or addiction counselor while living your normal life. You may see your therapist by yourself. Or you may be part of a group. In some cases, your family may see your therapist too.  · Self-help groups. These offer you support and encouragement. There are also support groups for the loved ones of people addicted to drugs.  · Medications. Your treatment may include certain medications, such as methadone, disulfiram, buprenorphine, or naltrexone.  · Alternative treatments. These may include acupuncture, hypnosis, or biofeedback. Ask your health care provider about them.  When times get tough  Drug addiction is never really cured. Sometimes, no matter how well youre doing, you may be tempted. If so, you can:  · Call your sponsor. This is someone in your self-help group who watches out for you.  · Talk to your therapist,  health care provider, or someone else you trust.  · Make a list of how much youve achieved.  · Find something to distract you. Go to a movie, go out for a walk, or call a friend.  Date Last Reviewed: 11/18/2014  © 7508-5925 [employer]. [address] [place] [address]. All rights reserved. This information is not intended as a substitute for professional medical care. Always follow your healthcare professional's instructions.          Understanding Heroin Abuse and Addiction  The personal cost of using heroin can be devastating. You may lose your job, your savings, even your friends and family. In time, you may lose your health or even your life. But heroin abuse and addiction can be treated. If you or a loved one has a drug problem, there is help. The first step is to admit the problem, then talk to someone you trust.    What is heroin?  Heroin is an illegal drug made from certain types of poppies. It's often sold as a powder or a black, tarry substance. Users may inject, smoke, sniff, or even eat the drug. This produces a feeling of intense pleasure that may last for several hours. Heroin is highly addictive. This means your body and mind develop a strong need for it. As a result, you spend more and more time seeking and using the drug. In fact, using heroin can become the main purpose of your life. And you may not be able to give it up on your own.  What are the risks of heroin use?  Addiction is one of the biggest risks of trying heroin. Other risks include:  · Exposure to HIV and hepatitis. Using shared needles to inject the drug can spread these diseases which can affect your health. And you may pass them on to your sexual partners and children.  · Overdose or death. The heroin you use may be stronger than you think. This can lead to an overdose. In some cases, too much heroin can be fatal.  · Early or stillbirths. Babies born to women using heroin may be born early. They also may not  survive birth. Or, they may be born addicted to heroin.  · Scarred or collapsed veins, or infections of the veins or skin  · Lung disease, such as pneumonia or tuberculosis  · Clogged blood vessels  · Damage to your brain or heart  Date Last Reviewed: 11/17/2014  © 3266-8845 [employer]. [address] [place] [address], [place] [address]. All rights reserved. This information is not intended as a substitute for professional medical care. Always follow your healthcare professional's instructions.

## 2017-05-08 ENCOUNTER — HOSPITAL ENCOUNTER (EMERGENCY)
Facility: HOSPITAL | Age: 22
Discharge: PSYCHIATRIC OR SUBSTANCE USE FACILITY | End: 2017-05-08
Attending: EMERGENCY MEDICINE
Payer: MEDICAID

## 2017-05-08 VITALS
TEMPERATURE: 99 F | RESPIRATION RATE: 18 BRPM | DIASTOLIC BLOOD PRESSURE: 89 MMHG | HEART RATE: 64 BPM | BODY MASS INDEX: 21.24 KG/M2 | SYSTOLIC BLOOD PRESSURE: 139 MMHG | WEIGHT: 160.25 LBS | OXYGEN SATURATION: 98 % | HEIGHT: 73 IN

## 2017-05-08 DIAGNOSIS — L03.115 CELLULITIS OF RIGHT LOWER EXTREMITY: Primary | ICD-10-CM

## 2017-05-08 PROCEDURE — 25000003 PHARM REV CODE 250: Performed by: PHYSICIAN ASSISTANT

## 2017-05-08 PROCEDURE — 99283 EMERGENCY DEPT VISIT LOW MDM: CPT | Mod: 25

## 2017-05-08 PROCEDURE — 96372 THER/PROPH/DIAG INJ SC/IM: CPT

## 2017-05-08 PROCEDURE — 99283 EMERGENCY DEPT VISIT LOW MDM: CPT | Mod: ,,, | Performed by: PHYSICIAN ASSISTANT

## 2017-05-08 RX ORDER — ONDANSETRON 4 MG/1
4 TABLET, ORALLY DISINTEGRATING ORAL
Status: COMPLETED | OUTPATIENT
Start: 2017-05-08 | End: 2017-05-08

## 2017-05-08 RX ORDER — CLINDAMYCIN HYDROCHLORIDE 150 MG/1
300 CAPSULE ORAL 4 TIMES DAILY
Qty: 56 CAPSULE | Refills: 0 | Status: SHIPPED | OUTPATIENT
Start: 2017-05-08 | End: 2017-05-15

## 2017-05-08 RX ORDER — CLINDAMYCIN HYDROCHLORIDE 150 MG/1
300 CAPSULE ORAL
Status: COMPLETED | OUTPATIENT
Start: 2017-05-08 | End: 2017-05-08

## 2017-05-08 RX ORDER — CLINDAMYCIN PHOSPHATE 150 MG/ML
600 INJECTION, SOLUTION INTRAVENOUS
Status: COMPLETED | OUTPATIENT
Start: 2017-05-08 | End: 2017-05-08

## 2017-05-08 RX ADMIN — CLINDAMYCIN PHOSPHATE 600 MG: 150 INJECTION, SOLUTION INTRAVENOUS at 08:05

## 2017-05-08 RX ADMIN — ONDANSETRON 4 MG: 4 TABLET, ORALLY DISINTEGRATING ORAL at 08:05

## 2017-05-08 RX ADMIN — CLINDAMYCIN HYDROCHLORIDE 300 MG: 150 CAPSULE ORAL at 08:05

## 2017-05-08 NOTE — ED AVS SNAPSHOT
[facility]  [address] [facility] [address]               Robert Batres   2017  7:40 PM   ED    Description:  Male : 1995   Department:  [facility]           Your Care was Coordinated By:     Provider Role From To    Renaldo Savage MD Attending Provider 17 --    Claire Perez PA-C Physician Assistant 17 --      Reason for Visit     Skin Problem           Diagnoses this Visit        Comments    Cellulitis of right lower extremity    -  Primary       ED Disposition     None           To Do List           Follow-up Information     Schedule an appointment as soon as possible for a visit with [facility].    Specialties:  Physical Therapy, Psychiatry, Behavioral Health, Geriatric Medicine    Contact information:    [address] [facility] [address]  [phone]          Schedule an appointment as soon as possible for a visit with [facility].    Specialties:  Behavioral Health, Psychiatry    Contact information:    [address] [facility] [address]  [phone]          Follow up with [facility].    Specialty:  Emergency Medicine    Why:  If symptoms worsen    Contact information:    [address] [facility] [id number] [phone]       These Medications        Disp Refills Start End    clindamycin (CLEOCIN) 150 MG capsule 56 capsule 0 2017 5/15/2017    Take 2 capsules (300 mg total) by mouth 4 (four) times daily. - Oral      [facility]     [facility] Care [facility] -  Assistance  Unless otherwise directed by your provider, please contact [facility], our nurse care line that is available for  assistance.     Registered nurses in the [facility] provide: appointment scheduling, clinical advisement, health education, and other advisory services.  Call: [phone] (toll free)                Medications           Message regarding Medications     Verify the changes and/or additions to your medication regime listed below are the same as discussed with your clinician today.  If any of these changes or additions are incorrect, please notify your healthcare provider.        START taking these NEW medications        Refills    clindamycin (CLEOCIN) 150 MG capsule 0    Sig: Take 2 capsules (300 mg total) by mouth 4 (four) times daily.    Class: Print    Route: Oral      These medications were administered today        Dose Freq    clindamycin capsule 300 mg 300 mg ED 1 Time    Sig: Take 2 capsules (300 mg total) by mouth ED 1 Time.    Class: Normal    Route: Oral    Cosign for Ordering: Required by Renaldo Savage MD    ondansetron disintegrating tablet 4 mg 4 mg ED 1 Time    Sig: Take 1 tablet (4 mg total) by mouth ED 1 Time.    Class: Normal    Route: Oral    Cosign for Ordering: Required by Renaldo Savage MD    clindamycin injection 600 mg 600 mg ED 1 Time    Sig: Inject 4 mLs (600 mg total) into the muscle ED 1 Time.    Class: Normal    Route: Intramuscular    Cosign for Ordering: Required by Renaldo Savage MD           Verify that the below list of medications is an accurate representation of the medications you are currently taking.  If none reported, the list may be blank. If incorrect, please contact your healthcare provider. Carry this list with you in case of emergency.           Current Medications     clindamycin (CLEOCIN) 150 MG capsule Take 2 capsules (300 mg total) by mouth 4 (four) times daily.    clindamycin injection 600 mg Inject 4 mLs (600 mg total) into the muscle ED 1 Time.    escitalopram oxalate (LEXAPRO) 10 MG tablet Take 1 tablet (10 mg total) by mouth once daily.    gabapentin (NEURONTIN) 300 MG capsule Take 2 capsules (600 mg total) by mouth 3 (three) times daily.    mirtazapine (REMERON) 30 MG tablet Take 1 tablet (30 mg total) by mouth every evening.  "          Clinical Reference Information           Your Vitals Were     BP Pulse Temp Resp Height Weight    148/88 67 98.6 °F (37 °C) (Oral) 18 6' 1" (1.854 m) 72.7 kg (160 lb 4.4 oz)    SpO2 BMI             99% 21.15 kg/m2         Allergies as of 5/8/2017     No Known Allergies      Immunizations Administered on Date of Encounter - 5/8/2017     None      ED Micro, Lab, POCT     None      ED Imaging Orders     None        Discharge Instructions       I extended your antibiotics for another 7 days.  Take clindamycin for times a day which is every 6 hours.  Follow up closely with [facility].  Follow-up with primary care physician at [facility].  Return to the emergency department for any worsening symptoms.    No future appointments.    Cellulitis  Cellulitis is an infection of the deep layers of skin. A break in the skin, such as a cut or scratch, can let bacteria under the skin. If the bacteria get to deep layers of the skin, it can be serious. If not treated, cellulitis can get into the bloodstream and lymph nodes. The infection can then spread throughout the body. This causes serious illness.  Cellulitis causes the affected skin to become red, swollen, warm, and sore. The reddened areas have a visible border. An open sore may leak fluid (pus). You may have a fever, chills, and pain.  Cellulitis is treated with antibiotics taken for 7 to 10 days. An open sore may be cleaned and covered with cool wet gauze. Symptoms should get better 1 to 2 days after treatment is started. Make sure to take all the antibiotics for the full number of days until they are gone. Keep taking the medicine even if your symptoms go away.  Home care  Follow these tips:  · Limit the use of the part of your body with cellulitis.   · If the infection is on your leg, keep your leg raised while sitting. This will help to reduce swelling.  · Take all of the antibiotic medicine exactly as directed until it is gone. Do not " miss any doses, especially during the first 7 days. Dont stop taking the medicine when your symptoms get better.  · Keep the affected area clean and dry.  · Wash your hands with soap and warm water before and after touching your skin. Anyone else who touches your skin should also wash his or her hands. Don't share towels.  Follow-up care  Follow up with your healthcare provider, or as advised. If your infection does not go away on the first antibiotic, your healthcare provider will prescribe a different one.  When to seek medical advice  Call your healthcare provider right away if any of these occur:  · Red areas that spread  · Swelling or pain that gets worse  · Fluid leaking from the skin (pus)  · Fever higher of 100.4º F (38.0º C) or higher after 2 days on antibiotics  Date Last Reviewed: 9/1/2016  © 2542-8435 [employer]. [address] [place] [address]. All rights reserved. This information is not intended as a substitute for professional medical care. Always follow your healthcare professional's instructions.      [facility] Sign-Up     Activating your [facility] account is as easy as 1-2-3!     1) Visit [email or web address], select Sign Up Now, enter this activation code and your date of birth, then select Next.  [email or web address]  Expires: 6/16/2017 12:17 PM      2) Create a username and password to use when you visit [facility] in the future and select a security question in case you lose your password and select Next.    3) Enter your e-mail address and click Sign Up!    Additional Information  If you have questions, please e-mail [email or web address] or call [phone] to talk to our [facility] staff. Remember, [facility] is NOT to be used for urgent needs. For medical emergencies, dial 911.         Smoking Cessation     If you would like to quit smoking:   You may be eligible for free services if you are a [place] resident and started smoking cigarettes before September 1, 1988.  Call  the Smoking Cessation Trust (SCT) toll free at (481) 604-8360 or (868) 216-1914.   Call 1-800-QUIT-NOW if you do not meet the above criteria.   Contact us via email: [email or web address]   View our website for more information: [email or web address]         [facility] complies with applicable Federal civil rights laws and does not discriminate on the basis of race, color, national origin, age, disability, or sex.        Language Assistance Services     ATTENTION: Language assistance services are available, free of charge. Please call [phone].      ATENCIÓN: Si habla español, tiene a schwab disposición servicios gratuitos de asistencia lingüística. Llame al [phone].     CHÚ Ý: N?u b?n nói Ti?ng Vi?t, có các d?ch v? h? tr? ngôn ng? mi?n phí dành cho b?n. G?i s? [phone].

## 2017-05-09 NOTE — ED PROVIDER NOTES
"Encounter Date: 5/8/2017    SCRIBE #1 NOTE: I, Shanthi Early, am scribing for, and in the presence of,  ROLA Marks. I have scribed the following portions of the note - Other sections scribed: HPI ROS.       History     Chief Complaint   Patient presents with    Skin Problem     on antibiotics and infection on arm and leg injecting heroin, infection no better     Review of patient's allergies indicates:  No Known Allergies  HPI Comments: Time seen by provider: 7:55 PM    This is a 21 y.o. male with PM Hx of substance abuse and bipolar disorder who presents with complaint of pain and swelling of RLE with associated erythema. Pt states he last injected heroin 24 hours ago at [facility] and reports using a clean needle. He also endorses withdrawal symptoms from IV heroin such as diaphoresis, decreased appetite, nausea, and tremors. He had been using heroin every hour. He denies SI or any other symptoms at this time. Pt states he is ready to change his lifestyle and "get clean." He has arranged care with [facility] and they are expecting him tonight. Recently diagnosed with skin infections on LUE and was prescribed clindamycin po which has now resolved, but now developed new symptoms of infection to RLE after injection.    The history is provided by the patient and medical records.     Past Medical History:   Diagnosis Date    ADHD (attention deficit hyperactivity disorder)     Anxiety     Anxiety     Benzodiazepine abuse     Bipolar disorder     Depression     History of psychiatric hospitalization     Hx of psychiatric care     Panic attack     Psychiatric problem     Serotonin syndrome     Substance abuse     Suicide attempt      History reviewed. No pertinent surgical history.  History reviewed. No pertinent family history.  Social History   Substance Use Topics    Smoking status: Current Every Day Smoker     Types: Pipe    Smokeless tobacco: Never Used    Alcohol use Yes     Review of Systems "   Constitutional: Positive for appetite change (decreased) and diaphoresis.   HENT: Negative for congestion, ear pain and sore throat.    Eyes: Negative for visual disturbance.   Respiratory: Negative for cough and shortness of breath.    Cardiovascular: Negative for chest pain.   Gastrointestinal: Positive for nausea. Negative for abdominal pain.   Endocrine: Negative for polyphagia and polyuria.   Genitourinary: Negative for dysuria and hematuria.   Musculoskeletal: Negative for back pain and neck pain.        (+) RLE and LUE pain and edema   Skin:        (+) Erythema RLE and LUE   Neurological: Positive for tremors. Negative for headaches.   Psychiatric/Behavioral: Negative for suicidal ideas.       Physical Exam   Initial Vitals   BP Pulse Resp Temp SpO2   05/08/17 1848 05/08/17 1848 05/08/17 1848 05/08/17 1848 05/08/17 1848   148/88 67 18 98.6 °F (37 °C) 99 %     Physical Exam    Vitals reviewed.  Constitutional: He appears well-developed and well-nourished.   HENT:   Head: Normocephalic and atraumatic.   Nose: Nose normal.   Eyes: Conjunctivae and EOM are normal.   Neck: Normal range of motion.   Cardiovascular: Normal rate, regular rhythm and normal heart sounds. Exam reveals no friction rub.    No murmur heard.  Pulmonary/Chest: Breath sounds normal. No respiratory distress. He has no wheezes. He has no rales.   Abdominal: Soft. Bowel sounds are normal. He exhibits no distension. There is no tenderness.   Musculoskeletal: Normal range of motion.   Neurological: He is alert and oriented to person, place, and time. He has normal strength. No sensory deficit.   Skin: Skin is warm and dry. No erythema.   6 x 8 cm edema, overylying erythema, and tenderness to right calf. No surrounding erythema. No open wounds or rashes noted.    Psychiatric: He has a normal mood and affect. Thought content normal.   No SI/HI or plan         ED Course   Procedures  Labs Reviewed - No data to display          Medical Decision  Making:   History:   Old Medical Records: I decided to obtain old medical records.       APC / Resident Notes:   Patient is a 21-year-old male with a past medical history of substance abuse and psychiatric disorder who presents the ED with right calf pain, swelling, and redness onset 2 days. Infection to LUE resolved. On exam, afebrile.  He has a 6 x 8 cm edema, erythema, tenderness noted to right calf.  No overt fluctuance or surrounding erythema appreciated.  No suicidal or homicidal ideations.  Patient received a call from [facility] during my exam and was notified that he has a bed for rehabilitation and that he should come [facility].  I will give patient clindamycin IM injection and a prescription for oral clindamycin to continue for the next 7 days.  He is to go to [facility] for rehab. He is receptive about getting and staying clean.  ED return precaution given.  All questions answered.  Patient is comfortable with plan and stable for planned.  I reviewed patient's chart and discussed this case with my supervising SANDRA Mckeon Attestation:   Scribe #1: I performed the above scribed service and the documentation accurately describes the services I performed. I attest to the accuracy of the note.    Attending Attestation:     Physician Attestation Statement for NP/PA:   I discussed this assessment and plan of this patient with the NP/PA, but I did not personally examine the patient. The face to face encounter was performed by the NP/PA.        Physician Attestation for Scribe:  Physician Attestation Statement for Scribe #1: I, ROLA Marks, reviewed documentation, as scribed by Shanthi Early in my presence, and it is both accurate and complete.                 ED Course     Clinical Impression:   The encounter diagnosis was Cellulitis of right lower extremity.    Disposition:   Disposition: Discharged  Condition: Stable       Claire Perez PA-C  05/09/17 0127       Renaldo DESOUZA  MD Janette  05/12/17 0133

## 2017-05-09 NOTE — DISCHARGE INSTRUCTIONS
I extended your antibiotics for another 7 days.  Take clindamycin for times a day which is every 6 hours.  Follow up closely with [facility] tonight.  Follow-up with primary care physician at [facility].  Return to the emergency department for any worsening symptoms.    No future appointments.    Cellulitis  Cellulitis is an infection of the deep layers of skin. A break in the skin, such as a cut or scratch, can let bacteria under the skin. If the bacteria get to deep layers of the skin, it can be serious. If not treated, cellulitis can get into the bloodstream and lymph nodes. The infection can then spread throughout the body. This causes serious illness.  Cellulitis causes the affected skin to become red, swollen, warm, and sore. The reddened areas have a visible border. An open sore may leak fluid (pus). You may have a fever, chills, and pain.  Cellulitis is treated with antibiotics taken for 7 to 10 days. An open sore may be cleaned and covered with cool wet gauze. Symptoms should get better 1 to 2 days after treatment is started. Make sure to take all the antibiotics for the full number of days until they are gone. Keep taking the medicine even if your symptoms go away.  Home care  Follow these tips:  · Limit the use of the part of your body with cellulitis.   · If the infection is on your leg, keep your leg raised while sitting. This will help to reduce swelling.  · Take all of the antibiotic medicine exactly as directed until it is gone. Do not miss any doses, especially during the first 7 days. Dont stop taking the medicine when your symptoms get better.  · Keep the affected area clean and dry.  · Wash your hands with soap and warm water before and after touching your skin. Anyone else who touches your skin should also wash his or her hands. Don't share towels.  Follow-up care  Follow up with your healthcare provider, or as advised. If your infection does not go away on the first  antibiotic, your healthcare provider will prescribe a different one.  When to seek medical advice  Call your healthcare provider right away if any of these occur:  · Red areas that spread  · Swelling or pain that gets worse  · Fluid leaking from the skin (pus)  · Fever higher of 100.4º F (38.0º C) or higher after 2 days on antibiotics  Date Last Reviewed: 9/1/2016  © 7078-4641 [employer]. [address] [place] [address], [place] [address]. All rights reserved. This information is not intended as a substitute for professional medical care. Always follow your healthcare professional's instructions.

## 2017-05-09 NOTE — ED TRIAGE NOTES
Pt presents to the ED c c/o warmth, erythema, and edema to his RLE (calf). Pt was given Clindamycin QID 2 tablets for skin infection and states that the RLE is not getting better. Pt denies chills/fever, but states that he is withdrawing heroin.

## 2017-10-21 ENCOUNTER — HOSPITAL ENCOUNTER (EMERGENCY)
Facility: HOSPITAL | Age: 22
Discharge: HOME | End: 2017-10-21
Attending: EMERGENCY MEDICINE
Payer: MEDICAID

## 2017-10-21 VITALS
OXYGEN SATURATION: 99 % | DIASTOLIC BLOOD PRESSURE: 86 MMHG | HEART RATE: 77 BPM | WEIGHT: 160.25 LBS | TEMPERATURE: 99 F | RESPIRATION RATE: 18 BRPM | SYSTOLIC BLOOD PRESSURE: 133 MMHG | HEIGHT: 73 IN | BODY MASS INDEX: 21.24 KG/M2

## 2017-10-21 DIAGNOSIS — F19.10 POLYSUBSTANCE ABUSE: Primary | ICD-10-CM

## 2017-10-21 DIAGNOSIS — F19.10 DRUG ABUSE, IV: ICD-10-CM

## 2017-10-21 PROCEDURE — 93010 ELECTROCARDIOGRAM REPORT: CPT | Mod: ,,, | Performed by: INTERNAL MEDICINE

## 2017-10-21 PROCEDURE — 99284 EMERGENCY DEPT VISIT MOD MDM: CPT | Mod: ,,, | Performed by: EMERGENCY MEDICINE

## 2017-10-21 PROCEDURE — 99284 EMERGENCY DEPT VISIT MOD MDM: CPT | Mod: 25

## 2017-10-21 PROCEDURE — 93005 ELECTROCARDIOGRAM TRACING: CPT

## 2017-10-22 NOTE — ED TRIAGE NOTES
"Pt took cocaine, reports he "shot up 1mL". States that he does do uppers and usually only does downers. States that he didn't realize that it was cocaine. Pt states that he also smoked marijuana that may have been laced with heroin. Pt states he felt his heart racing, his eyes vibrating. Pt states he feels tired now.   "

## 2017-10-22 NOTE — ED PROVIDER NOTES
Encounter Date: 10/21/2017    SCRIBE #1 NOTE: I, Kathi White, am scribing for, and in the presence of,  Dr. Narvaez. I have scribed the following portions of the note - the Resident attestation.       History     Chief Complaint   Patient presents with    Drug Overdose     took cocaine. States that he does do uppers and usually only does downers. States that he didn't realize that it was cocaine.      HPI   23 yo M with a PMH of substance abuse, Bipolar disorder, and anxiety presents with anxiety after ingestion cocaine today. Patient states he ingested 1 mL of cocaine around 6:20 pm, that he thought was heroin he was taking to get high. He also is endorsing smoking marijuana today, denies other drugs or alcohol use. Patient state he felt anxious with associated chest numbness and intermittent chest pain that lasted 30 minutes, but that is resolved at this time. He also endorses 3-4 episodes of non bloody, non bilious emesis.   Review of patient's allergies indicates:  No Known Allergies  Past Medical History:   Diagnosis Date    ADHD (attention deficit hyperactivity disorder)     Anxiety     Anxiety     Benzodiazepine abuse     Bipolar disorder     Depression     History of psychiatric hospitalization     Hx of psychiatric care     Panic attack     Psychiatric problem     Serotonin syndrome     Substance abuse     Suicide attempt      History reviewed. No pertinent surgical history.  History reviewed. No pertinent family history.  Social History   Substance Use Topics    Smoking status: Current Every Day Smoker     Types: Pipe    Smokeless tobacco: Never Used    Alcohol use Yes     Review of Systems   Constitutional: Negative for chills, diaphoresis and fever.   Eyes: Negative for visual disturbance.   Respiratory: Negative for shortness of breath.    Cardiovascular: Negative for chest pain and palpitations.   Gastrointestinal: Negative for abdominal pain, blood in stool, constipation, diarrhea  and nausea.   Genitourinary: Negative for dysuria.   Musculoskeletal: Negative for back pain and neck pain.   Skin: Negative for rash and wound.   Neurological: Positive for headaches. Negative for dizziness, weakness, light-headedness and numbness.   Psychiatric/Behavioral: The patient is not nervous/anxious.        Physical Exam     Initial Vitals [10/21/17 1936]   BP Pulse Resp Temp SpO2   126/76 93 16 98.5 °F (36.9 °C) 98 %      MAP       92.67         Physical Exam    Nursing note and vitals reviewed.  Constitutional: He appears well-developed and well-nourished. He is not diaphoretic. No distress.   HENT:   Head: Normocephalic and atraumatic.   Eyes: Conjunctivae and EOM are normal. Pupils are equal, round, and reactive to light. No scleral icterus.   Neck: Normal range of motion. Neck supple. No JVD present.   Cardiovascular: Normal rate, regular rhythm, normal heart sounds and intact distal pulses. Exam reveals no gallop and no friction rub.    No murmur heard.  Pulmonary/Chest: Breath sounds normal. No respiratory distress. He has no wheezes. He has no rhonchi. He has no rales. He exhibits no tenderness.   Abdominal: Soft. Bowel sounds are normal. He exhibits no distension and no mass. There is no tenderness. There is no rebound and no guarding.   Musculoskeletal: Normal range of motion. He exhibits no edema or tenderness.   Neurological: He is alert and oriented to person, place, and time.   No focal deficits    Skin: Skin is warm and dry. Capillary refill takes less than 2 seconds. No rash and no abscess noted. No erythema. No pallor.   Well healed linear superficial abrasion to bilateral forearms         ED Course   Procedures  Labs Reviewed - No data to display  EKG Readings: (Independently Interpreted)   Initial Reading: No STEMI. Rhythm: Normal Sinus Rhythm. Heart Rate: 81. Axis: Right Axis Deviation.       X-Rays:   Independently Interpreted Readings:   Chest X-Ray: Normal heart size.  No  infiltrates.  No acute abnormalities.     Medical Decision Making:   History:   Old Medical Records: I decided to obtain old medical records.  Independently Interpreted Test(s):   I have ordered and independently interpreted X-rays - see prior notes.  I have ordered and independently interpreted EKG Reading(s) - see prior notes  Clinical Tests:   Radiological Study: Ordered and Reviewed  Medical Tests: Ordered and Reviewed       APC / Resident Notes:   HO-II MDM  Patient presents after with anxiety after heroin ingestion. Physical exam unremarkable. DDx includes but is not limited to ACS, coronary aneurysm, aortic dissection, and arrhythmia. Will obtain an EKG and CXR. Will anticipate discharge home       Pt is stable for discharge, w/ VSS and may return home with strict return precautions and adequate follow-up.   Patient is aware of plan and is amenable.     Yamilex Fish D.O  [facility]IC[facility] PGY-2  8:11 PM 10/21/2017         Scribe Attestation:   Scribe #1: I performed the above scribed service and the documentation accurately describes the services I performed. I attest to the accuracy of the note.    Attending Attestation:   Physician Attestation Statement for Resident:  As the supervising MD   Physician Attestation Statement: I have personally seen and examined this patient.   I agree with the above history. -: 22 y.o. gentleman with accidental ingestion of a small amount of cocaine.  He felt poorly afterwards, but feels fine now.  Denies CP at this time.  EKG and CXR are unremarkable.  Advised that the patient not do drugs.  He is discharged in good condition.    As the supervising MD I agree with the above PE.    As the supervising MD I agree with the above treatment, course, plan, and disposition.  I have reviewed and agree with the residents interpretation of the following: x-rays.  I have reviewed the following: old records at this facility.                    ED Course      Clinical  Impression:   The primary encounter diagnosis was Polysubstance abuse. A diagnosis of Drug abuse, IV was also pertinent to this visit.    Disposition:   Disposition: Discharged  Condition: Stable                        Mickie Narvaez MD  10/21/17 7569       Mickie Narvaez MD  10/22/17 2807

## 2018-11-25 ENCOUNTER — HOSPITAL ENCOUNTER (EMERGENCY)
Facility: HOSPITAL | Age: 23
Discharge: HOME | End: 2018-11-25
Attending: EMERGENCY MEDICINE
Payer: MEDICAID

## 2018-11-25 VITALS
HEIGHT: 74 IN | BODY MASS INDEX: 19.89 KG/M2 | OXYGEN SATURATION: 93 % | WEIGHT: 155 LBS | SYSTOLIC BLOOD PRESSURE: 115 MMHG | RESPIRATION RATE: 16 BRPM | HEART RATE: 114 BPM | DIASTOLIC BLOOD PRESSURE: 62 MMHG | TEMPERATURE: 99 F

## 2018-11-25 DIAGNOSIS — L03.90 CELLULITIS, UNSPECIFIED CELLULITIS SITE: Primary | ICD-10-CM

## 2018-11-25 DIAGNOSIS — I80.8 SUPERFICIAL THROMBOPHLEBITIS OF RIGHT UPPER EXTREMITY: ICD-10-CM

## 2018-11-25 PROCEDURE — 99283 EMERGENCY DEPT VISIT LOW MDM: CPT

## 2018-11-25 PROCEDURE — 99284 EMERGENCY DEPT VISIT MOD MDM: CPT | Mod: ,,, | Performed by: NURSE PRACTITIONER

## 2018-11-25 RX ORDER — ESCITALOPRAM OXALATE 20 MG/1
20 TABLET ORAL DAILY
COMMUNITY

## 2018-11-25 RX ORDER — MIRTAZAPINE 45 MG/1
45 TABLET, FILM COATED ORAL NIGHTLY
COMMUNITY

## 2018-11-25 RX ORDER — CLONAZEPAM 1 MG/1
1 TABLET ORAL 2 TIMES DAILY PRN
COMMUNITY

## 2018-11-25 RX ORDER — BUPRENORPHINE HYDROCHLORIDE 8 MG/1
TABLET SUBLINGUAL 3 TIMES DAILY
COMMUNITY

## 2018-11-25 RX ORDER — SULFAMETHOXAZOLE AND TRIMETHOPRIM 800; 160 MG/1; MG/1
1 TABLET ORAL 2 TIMES DAILY
Qty: 14 TABLET | Refills: 0 | Status: SHIPPED | OUTPATIENT
Start: 2018-11-25 | End: 2018-12-02

## 2018-11-25 NOTE — DISCHARGE INSTRUCTIONS
Please follow up with PCP.  If symptoms like fever, redness and inflammation of the skin along a vein, warmth of the skin and tissue around the vein, tenderness and pain that worsens with added pressure develops, return to the emergency department.

## 2018-11-25 NOTE — ED TRIAGE NOTES
Presents to ER with complaint of pain and swelling to his right hand after shooting up with IV heroine.  Patient's name and date of birth checked and is correct.    LOC: The patient is awake, alert, and oriented to place, time, situation. Affect is appropriate.  Speech is appropriate and clear.      APPEARANCE: Patient resting comfortably, reporting palpation, light headedness,  in no acute distress.  Patient is clean and well groomed.     SKIN: The skin is warm and dry; color consistent with ethnicity.  Patient has normal skin turgor and moist mucus membranes.  Skin intact; no breakdown or bruising noted.      MUSCULOSKELETAL: Patient moving upper and lower extremities without difficulty.  Denies weakness.      RESPIRATORY: Airway is open and patent. Respirations spontaneous, even, easy, and non-labored.  Patient has a normal effort and rate.  No accessory muscle use noted. Denies cough.  BS clear.     CARDIAC:  No peripheral edema noted. No complaints of chest pain.       ABDOMEN: Soft and non tender to palpation.  No distention noted.      NEUROLOGIC: Eyes open spontaneously.  Behavior appropriate to situation.  Follows commands; facial expression symmetrical.  Purposeful motor response noted; normal sensation in all extremities.

## 2018-11-25 NOTE — ED PROVIDER NOTES
Encounter Date: 11/25/2018       History     Chief Complaint   Patient presents with    Hand Pain     Possible abscess from heroin use in R hand     Pt is a 22 y/o M with a PMHx of substance abuse and bi-polar presents to the ED for cellulitis to the hand, fever and chills for 2 days.  He reports extreme stress for the past few weeks and had a relapse 2 days ago (most recent use yesterday).  He also reports using ecstasy yesterday which could could account for his elevated heart rate.  Since then, he has developed tender, red knot on the top of his hand.  Subutex taken PTA.  Denies n/v, FB, human bite, MRSA, or immunosuppression.                Review of patient's allergies indicates:  No Known Allergies  Past Medical History:   Diagnosis Date    ADHD (attention deficit hyperactivity disorder)     Anxiety     Anxiety     Benzodiazepine abuse     Bipolar disorder     Depression     History of psychiatric hospitalization     Hx of psychiatric care     Panic attack     Psychiatric problem     Serotonin syndrome     Substance abuse     Suicide attempt      History reviewed. No pertinent surgical history.  History reviewed. No pertinent family history.  Social History     Tobacco Use    Smoking status: Current Every Day Smoker     Types: Pipe    Smokeless tobacco: Never Used   Substance Use Topics    Alcohol use: Yes    Drug use: Yes     Types: Marijuana, Benzodiazepines, Fentanyl, Heroin, IV     Comment: fr[place]     Review of Systems   Constitutional: Positive for chills and fever.   HENT: Negative for sore throat.    Respiratory: Negative for shortness of breath and wheezing.    Cardiovascular: Negative for chest pain.   Gastrointestinal: Negative for abdominal pain and nausea.   Genitourinary: Negative for dysuria and flank pain.   Musculoskeletal: Negative for back pain.   Skin: Negative for rash.        Redness and tenderness to the right hand.    Neurological: Negative for dizziness, weakness,  numbness and headaches.   Hematological: Does not bruise/bleed easily.   All other systems reviewed and are negative.      Physical Exam     Initial Vitals [11/25/18 1347]   BP Pulse Resp Temp SpO2   115/62 (!) 114 16 98.5 °F (36.9 °C) (!) 93 %      MAP       --         Physical Exam    Vitals reviewed.  Constitutional: He appears well-developed and well-nourished. He is not diaphoretic. No distress.   Alert, sitting in on the table in no discomfort.    HENT:   Head: Atraumatic.   Eyes: EOM are normal.   Neck: Normal range of motion.   Cardiovascular:   No murmur heard.  Tachycardic    Pulmonary/Chest: Breath sounds normal. He has no wheezes.   Abdominal: Soft. There is no tenderness.   Musculoskeletal: Normal range of motion.   Neurological: He is alert and oriented to person, place, and time.   Skin: Skin is warm. Capillary refill takes less than 2 seconds.   Point tenderness to the dorsum/ulnar portion of the right hand.  3 cm raised knot palpated with surrounding erythema present.  No fluctuance, induration or drainage.  Superficial thrombophlebitis to the dorsal vein on the right hand.         Neurovascular status intact and normal active ROM.  Able to flex/extend wrist.  2+ radial pulses.  Able to abduct/adduct fingers of the right hand.    Psychiatric: He has a normal mood and affect.         ED Course   Procedures  Labs Reviewed - No data to display       Imaging Results    None          Medical Decision Making:   Differential Diagnosis:   Septic joint, abscess, substance abuse, FB insertion cellulitis, animal bite, or pressure injury  ED Management:  Pt has no fever, generalized weakness or severe pain so less likely for septic joint.  No fluctuance or induration so unlikely for abscess.  Pt states the needle did not break off after inserting heroin so unlikely for FB presence.  Will reassess.    3:15 PM  I do not see an indication for imaging or labs at this time, but have advised close follow with his PCP  and may require further treatment if condition continues.  I will send home with a prescription for Bactrim and inform him of when to return to the ED if his symptoms are worsening.  Pt stated he understood.  Pt is comfortable with this plan.     Other:   I have discussed this case with another health care provider.              Attending Attestation:     Physician Attestation Statement for NP/PA:   I have conducted a face to face encounter with this patient in addition to the NP/PA, due to NP/PA Request    Other NP/PA Attestation Additions:      Medical Decision Makin-year-old male history of IVDA presents with right hand pain and redness.  He has a 3 cm area of redness over the ulnar region of his right wrist.  There is full active range of motion of the right wrist, I doubt septic joint.  There is a small 3 cm area of induration along the right basilic vein from the dorsal aspect of the right hand.  There is no fluctuance or erythema along that vein.   Heart sounds are mildly tachycardic with no murmurs, rubs, gallops.  No fever or murmurs to suggest septic cardiac valve or vegetation.    Lungs are clear with appropriate work of breathing on room air, pulse ox was 93% on arrival but I doubt pneumonia given no respiratory symptoms or complaints. No evidence of septic joint given reassuring range of motion as above.  No evidence of abscess.  Overall symptoms are concerning for cellulitis with a small area of superficial thrombophlebitis.  Patient encouraged with instructions for symptomatic treatment of thrombophlebitis.  Will be started on a course of Bactrim for cellulitis.  He was mildly tachycardic on arrival but has brisk cap refill and appears well hydrated, he does not appear septic.  He attributes tachycardia to the Ecstasy took last night.  Patient was extensively provided with return precautions and the importance of follow-up.                    Clinical Impression:   The primary encounter  diagnosis was Cellulitis, unspecified cellulitis site. A diagnosis of Superficial thrombophlebitis of right upper extremity was also pertinent to this visit.      Disposition:   Disposition: Discharged  Condition: Stable    I have discussed the care of the pt with my supervising MD.                         Jett Doyle III, NP  11/25/18 2052

## 2019-12-08 ENCOUNTER — HOSPITAL ENCOUNTER (EMERGENCY)
Facility: HOSPITAL | Age: 24
Discharge: HOME | End: 2019-12-08
Attending: EMERGENCY MEDICINE
Payer: MEDICAID

## 2019-12-08 VITALS
TEMPERATURE: 97 F | DIASTOLIC BLOOD PRESSURE: 84 MMHG | HEIGHT: 74 IN | SYSTOLIC BLOOD PRESSURE: 120 MMHG | BODY MASS INDEX: 20.53 KG/M2 | RESPIRATION RATE: 18 BRPM | OXYGEN SATURATION: 98 % | WEIGHT: 160 LBS | HEART RATE: 88 BPM

## 2019-12-08 DIAGNOSIS — Z09 PSYCHIATRIC FOLLOW-UP: Primary | ICD-10-CM

## 2019-12-08 DIAGNOSIS — Z86.59 PSYCHIATRIC FOLLOW-UP: Primary | ICD-10-CM

## 2019-12-08 DIAGNOSIS — F31.9 BIPOLAR AFFECTIVE DISORDER, REMISSION STATUS UNSPECIFIED: ICD-10-CM

## 2019-12-08 PROCEDURE — 99281 EMR DPT VST MAYX REQ PHY/QHP: CPT

## 2019-12-15 NOTE — ED PROVIDER NOTES
Encounter Date: 12/8/2019       History     Chief Complaint   Patient presents with    Psychiatric Evaluation     pt released from prison, was on suicide watch there and sent here on discharge for psych clearence.     Pt was just discharged from prison where he was held on suicide watch.  Pt denies any SI, HI and denies any auditory or visual hallucinations at this time.          Review of patient's allergies indicates:  No Known Allergies  Past Medical History:   Diagnosis Date    ADHD (attention deficit hyperactivity disorder)     Anxiety     Anxiety     Benzodiazepine abuse     Bipolar disorder     Depression     History of psychiatric hospitalization     Hx of psychiatric care     Panic attack     Psychiatric problem     Serotonin syndrome     Substance abuse     Suicide attempt      No past surgical history on file.  No family history on file.  Social History     Tobacco Use    Smoking status: Current Every Day Smoker     Types: Pipe    Smokeless tobacco: Never Used   Substance Use Topics    Alcohol use: Yes    Drug use: Yes     Types: Marijuana, Benzodiazepines, Fentanyl, Heroin, IV     Comment: fransisco     Review of Systems   Constitutional: Negative for chills and fever.   HENT: Negative for congestion, postnasal drip, rhinorrhea, sinus pressure and sore throat.    Eyes: Negative for pain and redness.   Respiratory: Negative for cough and shortness of breath.    Cardiovascular: Negative for chest pain.   Gastrointestinal: Negative for abdominal distention, abdominal pain, blood in stool, constipation, diarrhea, nausea and vomiting.   Endocrine: Negative for cold intolerance and heat intolerance.   Genitourinary: Negative for dysuria, flank pain, hematuria and urgency.   Musculoskeletal: Negative for arthralgias and myalgias.   Skin: Negative for rash and wound.   Allergic/Immunologic: Negative for immunocompromised state.   Neurological: Negative for weakness, light-headedness and headaches.    Hematological: Does not bruise/bleed easily.   Psychiatric/Behavioral: Negative for agitation, behavioral problems, confusion, decreased concentration, dysphoric mood, hallucinations, self-injury and sleep disturbance. The patient is not nervous/anxious and is not hyperactive.        Physical Exam     Initial Vitals [12/08/19 0330]   BP Pulse Resp Temp SpO2   120/84 88 18 97.4 °F (36.3 °C) 98 %      MAP       --         Physical Exam    Nursing note and vitals reviewed.  Constitutional: He appears well-developed and well-nourished. He is not diaphoretic. No distress.   HENT:   Head: Normocephalic and atraumatic.   Right Ear: External ear normal.   Left Ear: External ear normal.   Mouth/Throat: Oropharynx is clear and moist.   Eyes: Conjunctivae and EOM are normal. Pupils are equal, round, and reactive to light. Right eye exhibits no discharge. Left eye exhibits no discharge. No scleral icterus.   Neck: Normal range of motion. No tracheal deviation present. No JVD present.   Cardiovascular: Normal rate, regular rhythm, normal heart sounds and intact distal pulses. Exam reveals no gallop and no friction rub.    No murmur heard.  Pulmonary/Chest: Breath sounds normal. No stridor. No respiratory distress. He has no wheezes. He has no rhonchi. He has no rales.   Abdominal: Soft. He exhibits no distension. There is no tenderness. There is no rebound and no guarding.   Musculoskeletal: Normal range of motion. He exhibits no edema or tenderness.   Neurological: He is alert and oriented to person, place, and time. He has normal strength. GCS score is 15. GCS eye subscore is 4. GCS verbal subscore is 5. GCS motor subscore is 6.   SYLVIE with NGND's   Skin: Skin is warm and dry. Capillary refill takes less than 2 seconds. No rash and no abscess noted. No erythema. No pallor.   Psychiatric: He has a normal mood and affect. His behavior is normal. Judgment and thought content normal.         ED Course   Procedures  Labs Reviewed  - No data to display       Imaging Results    None         Pt arrived alert, afebrile, non-toxic in appearance, in no acute respiratory distress with VSS.  PE unremarkable with no findings to warrant PEC criteria.  Pt discharged and counseled on the need to return to the nearest emergency room if they experience any other concerning symptoms.  Pt counseled to F/U outpatient with a PCP over the next two to three days.    Carmelo Borden MD                                           Clinical Impression:       ICD-10-CM ICD-9-CM   1. Psychiatric follow-up Z09 V67.3    Z86.59    2. Bipolar affective disorder, remission status unspecified F31.9 296.80                             Carmelo Borden MD  12/14/19 9771

## 2019-12-19 NOTE — ASSESSMENT & PLAN NOTE
- Patient with history of alcohol abuse  - Continue Valium taper   

## 2020-04-18 ENCOUNTER — HOSPITAL ENCOUNTER (EMERGENCY)
Facility: HOSPITAL | Age: 25
Discharge: HOME | End: 2020-04-18
Attending: EMERGENCY MEDICINE
Payer: MEDICAID

## 2020-04-18 VITALS
SYSTOLIC BLOOD PRESSURE: 122 MMHG | DIASTOLIC BLOOD PRESSURE: 80 MMHG | WEIGHT: 170 LBS | OXYGEN SATURATION: 99 % | HEIGHT: 73 IN | BODY MASS INDEX: 22.53 KG/M2 | TEMPERATURE: 98 F | RESPIRATION RATE: 20 BRPM | HEART RATE: 76 BPM

## 2020-04-18 DIAGNOSIS — L03.90 CELLULITIS, UNSPECIFIED CELLULITIS SITE: Primary | ICD-10-CM

## 2020-04-18 PROCEDURE — 99284 PR EMERGENCY DEPT VISIT,LEVEL IV: ICD-10-PCS | Mod: ,,, | Performed by: PHYSICIAN ASSISTANT

## 2020-04-18 PROCEDURE — 99284 EMERGENCY DEPT VISIT MOD MDM: CPT | Mod: ,,, | Performed by: PHYSICIAN ASSISTANT

## 2020-04-18 PROCEDURE — 99283 EMERGENCY DEPT VISIT LOW MDM: CPT

## 2020-04-18 RX ORDER — DOXYCYCLINE 100 MG/1
100 CAPSULE ORAL 2 TIMES DAILY
Qty: 20 CAPSULE | Refills: 0 | Status: SHIPPED | OUTPATIENT
Start: 2020-04-18 | End: 2020-04-28

## 2020-04-19 NOTE — ED PROVIDER NOTES
"Encounter Date: 4/18/2020       History     Chief Complaint   Patient presents with    Wound Infection     PT RECENTLY DC FROM  ON ABX FOR CELLULITIES TO FACE.      24-year-old white male history of depression, anxiety, substance abuse presents to the ED complaining of cellulitis to the face.  He states that he has been having these areas for the past month, he has been taking antibiotics but his symptoms are worsening.  He has a bottle of clindamycin with him that was filled on 3/17/20 with instructions to take every 6 hr but the bottle is full.  States he takes "at least one a day".  He states that they are bothering him any frequently picks at the area.  He denies fever, chills, chest pain, shortness breath, headache.    The history is provided by the patient.     Review of patient's allergies indicates:  No Known Allergies  Past Medical History:   Diagnosis Date    ADHD (attention deficit hyperactivity disorder)     Anxiety     Anxiety     Benzodiazepine abuse     Bipolar disorder     Depression     History of psychiatric hospitalization     Hx of psychiatric care     Panic attack     Psychiatric problem     Serotonin syndrome     Substance abuse     Suicide attempt      No past surgical history on file.  No family history on file.  Social History     Tobacco Use    Smoking status: Current Every Day Smoker     Types: Pipe    Smokeless tobacco: Never Used   Substance Use Topics    Alcohol use: Yes    Drug use: Yes     Types: Marijuana, Benzodiazepines, Fentanyl, Heroin, IV     Comment: frans[place]     Review of Systems   Constitutional: Negative for chills and fever.   HENT: Positive for facial swelling. Negative for ear pain.    Eyes: Negative for visual disturbance.   Respiratory: Negative for cough and shortness of breath.    Cardiovascular: Negative for chest pain.   Gastrointestinal: Positive for diarrhea. Negative for abdominal pain, nausea and vomiting.   Genitourinary: Negative for " dysuria.   Musculoskeletal: Negative for back pain.   Skin: Positive for wound. Negative for rash.   Neurological: Positive for headaches. Negative for light-headedness.   Psychiatric/Behavioral: Negative for confusion.       Physical Exam     Initial Vitals [04/18/20 1952]   BP Pulse Resp Temp SpO2   122/80 76 20 98.4 °F (36.9 °C) 99 %      MAP       --         Physical Exam    Nursing note and vitals reviewed.  Constitutional: He appears well-developed and well-nourished. He is not diaphoretic. No distress.   HENT:   Head: Normocephalic.   Small area of cellulitis noted to the R preauricular region with scab in place. No bleeding/drainage. Similar area to the L preauricular region. Patient continuously picking at areas.    Neck: Normal range of motion. Neck supple.   Cardiovascular: Normal rate, regular rhythm and normal heart sounds. Exam reveals no gallop and no friction rub.    No murmur heard.  Pulmonary/Chest: Breath sounds normal. He has no wheezes. He has no rhonchi. He has no rales.   Abdominal: Soft. Bowel sounds are normal. There is no tenderness. There is no rebound and no guarding.   Musculoskeletal: Normal range of motion.   Neurological: He is alert and oriented to person, place, and time.   Skin: Skin is warm and dry. No rash noted. No erythema.   Psychiatric: He has a normal mood and affect.         ED Course   Procedures  Labs Reviewed - No data to display       Imaging Results    None          Medical Decision Making:   History:   Old Medical Records: I decided to obtain old medical records.       APC / Resident Notes:   24-year-old white male history of depression, anxiety, substance abuse presents to the ED complaining of cellulitis to the face.  Vital signs stable.  Patient is in no acute distress.  Small area cellulitis with scabbing noted to the bilateral preauricular regions.  Patient constantly picks at these areas during interview.  There is no bleeding or drainage appreciated.  No  palpable abscess or fluctuance.  He has a prescription for clindamycin that was filled over a month ago with instructions to take every 6 hr, the bottle is full.  He states that he will take at least once a day.  I informed the patient that if he did not take the antibiotics as prescribed his symptoms will not improve and that this is likely why he is not getting any better.  Expressed understanding.  Will change antibiotic to something that is dosed less frequently.  I do not feel that he needs any labs or imaging.  Stable for discharge.    He was discharged with a prescription for doxycycline.  He will follow up with his PCP.  All of the patient's questions were answered.  I reviewed the patient's chart.                              Clinical Impression:       ICD-10-CM ICD-9-CM   1. Cellulitis, unspecified cellulitis site L03.90 682.9         Disposition:   Disposition: Discharged  Condition: Stable                        Madeline Conawy PA-C  04/18/20 5876

## 2020-04-19 NOTE — ED TRIAGE NOTES
Robert Batres, a 24 y.o. male presents to the ED w/ complaint of cellulitis to face pt currently on ABX but not taking them approprietly.       Triage note:  Chief Complaint   Patient presents with    Wound Infection     PT RECENTLY DC FROM  ON ABX FOR CELLULITIES TO FACE.      Review of patient's allergies indicates:  No Known Allergies  Past Medical History:   Diagnosis Date    ADHD (attention deficit hyperactivity disorder)     Anxiety     Anxiety     Benzodiazepine abuse     Bipolar disorder     Depression     History of psychiatric hospitalization     Hx of psychiatric care     Panic attack     Psychiatric problem     Serotonin syndrome     Substance abuse     Suicide attempt        Patient identifiers verified and correct for Robert Batres    LOC: The patient is awake, alert and aware of environment with an appropriate affect, the patient is oriented x 3 and speaking appropriately.   APPEARANCE: Patient appears comfortable and in no acute distress, patient is clean and well groomed.  SKIN: The skin is warm and dry, color consistent with ethnicity, patient has normal skin turgor and moist mucus membranes, skin intact, no breakdown or bruising noted.   MUSCULOSKELETAL: Patient moving all extremities spontaneously, no swelling noted.  RESPIRATORY: Airway is open and patent, respirations are spontaneous, patient has a normal effort and rate, no accessory muscle use noted.  CARDIAC: Patient has a normal rate and regular rhythm, no edema noted, capillary refill < 3 seconds.   GASTRO: Soft and non tender to palpation, no distention noted.   : Pt denies any pain or frequency with urination.  NEURO: Pt opens eyes spontaneously, behavior appropriate to situation, follows commands, facial expression symmetrical, bilateral hand grasp equal and even, purposeful motor response noted, normal sensation in all extremities when touched with a finger.

## 2020-10-14 ENCOUNTER — HOSPITAL ENCOUNTER (EMERGENCY)
Facility: HOSPITAL | Age: 25
Discharge: HOME | End: 2020-10-14
Attending: EMERGENCY MEDICINE
Payer: MEDICAID

## 2020-10-14 VITALS
TEMPERATURE: 99 F | HEIGHT: 70 IN | BODY MASS INDEX: 22.9 KG/M2 | RESPIRATION RATE: 18 BRPM | OXYGEN SATURATION: 97 % | SYSTOLIC BLOOD PRESSURE: 118 MMHG | WEIGHT: 160 LBS | DIASTOLIC BLOOD PRESSURE: 85 MMHG | HEART RATE: 92 BPM
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
## 2020-10-14 PROCEDURE — 99281 EMR DPT VST MAYX REQ PHY/QHP: CPT

## 2020-10-14 NOTE — ED NOTES
Patient identifiers for Robert Batres checked and correct.    LOC: The patient is awake, alert and aware of environment with an appropriate affect, the patient is oriented x 3 and speaking appropriately.  APPEARANCE: Patient resting comfortably and in no acute distress, patient is clean and well groomed, patient's clothing are properly fastened.  SKIN: The skin is warm and dry, patient has age appropriate skin turgor and moist mucus membranes, skin intact, no breakdown or bruising noted.  MUSCULOSKELETAL: Patient moving all extremities well, no obvious swelling or deformities noted.  RESPIRATORY: Airway is open and patent, respirations are spontaneous, patient has a normal effort and rate, no accessory muscle use noted.  Clear breath sounds bilaterally.  CARDIAC: Patient has a normal rate and rhythm, no periphreal edema noted, capillary refill < 3 seconds.  ABDOMEN: Soft and non tender to palpation, no distention noted.  Normoactive bowel sounds x4.  NEUROLOGIC: PERRL, facial expression is symmetrical, bilateral hand grasp equal and even, normal sensation in all extremities when touched with a finger.
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
## 2021-01-06 NOTE — ED NOTES
Pt seen by Md and continues to denie  Any SI or HI and will be dc'd pt has called girlfriend for ride    Spoke with patient regarding test results.

## 2021-05-24 ENCOUNTER — CLINICAL SUPPORT (OUTPATIENT)
Dept: URGENT CARE | Facility: CLINIC | Age: 26
End: 2021-05-24
Payer: MEDICAID

## 2021-05-24 DIAGNOSIS — Z01.89 PATIENT REQUEST FOR DIAGNOSTIC TESTING: Primary | ICD-10-CM

## 2021-05-24 LAB
CTP QC/QA: YES
SARS-COV-2 RDRP RESP QL NAA+PROBE: NEGATIVE

## 2021-05-24 PROCEDURE — 86580 POCT TB SKIN TEST: ICD-10-PCS | Mod: S$GLB,,, | Performed by: PHYSICIAN ASSISTANT

## 2021-05-24 PROCEDURE — 86580 TB INTRADERMAL TEST: CPT | Mod: S$GLB,,, | Performed by: PHYSICIAN ASSISTANT

## 2021-05-24 PROCEDURE — 87635: ICD-10-PCS | Mod: QW,S$GLB,, | Performed by: PHYSICIAN ASSISTANT

## 2021-05-24 PROCEDURE — 87635 SARS-COV-2 COVID-19 AMP PRB: CPT | Mod: QW,S$GLB,, | Performed by: PHYSICIAN ASSISTANT

## 2022-12-09 ENCOUNTER — HOSPITAL ENCOUNTER (EMERGENCY)
Facility: HOSPITAL | Age: 27
Discharge: HOME | End: 2022-12-09
Attending: EMERGENCY MEDICINE
Payer: MEDICAID

## 2022-12-09 VITALS
DIASTOLIC BLOOD PRESSURE: 70 MMHG | OXYGEN SATURATION: 95 % | HEART RATE: 107 BPM | TEMPERATURE: 99 F | SYSTOLIC BLOOD PRESSURE: 136 MMHG | RESPIRATION RATE: 24 BRPM

## 2022-12-09 DIAGNOSIS — Z13.9 ENCOUNTER FOR MEDICAL SCREENING EXAMINATION: Primary | ICD-10-CM

## 2022-12-09 PROCEDURE — 99283 EMERGENCY DEPT VISIT LOW MDM: CPT

## 2022-12-09 RX ORDER — NALOXONE HYDROCHLORIDE 1 MG/ML
INJECTION INTRAMUSCULAR; INTRAVENOUS; SUBCUTANEOUS
Qty: 2 ML | Refills: 11 | OUTPATIENT
Start: 2022-12-09 | End: 2023-07-01

## 2022-12-09 NOTE — DISCHARGE INSTRUCTIONS
Please fill the Narcan prescription and carry it with you at all times. Please try to follow up with Behavioral Health as soon as possible. If you feel unsafe or that you may try to harm yourself, to go your nearest ER.

## 2022-12-09 NOTE — ED PROVIDER NOTES
Encounter Date: 12/9/2022       History     Chief Complaint   Patient presents with    Mental Health Problem     Admits to heroin and ETOH today - found passed out in local restaurants bathroom. On arrival, awake, alert, manic. Denies pain.      HPI  27m pw heroin and etoh use, found sleeping in a bathroom at [employer] and was given option to go to [facility] or the hospital and chose to come to the ED  Pt has no complaints, does not feel he needs to be here, would like narcan rx and also resources to get back on subutex and catapres  Not suicidal, did not intend to hurt himself    Review of patient's allergies indicates:  No Known Allergies  Past Medical History:   Diagnosis Date    ADHD (attention deficit hyperactivity disorder)     Anxiety     Anxiety     Benzodiazepine abuse     Bipolar disorder     Depression     History of psychiatric hospitalization     Hx of psychiatric care     Panic attack     Psychiatric problem     Serotonin syndrome     Substance abuse     Suicide attempt      No past surgical history on file.  No family history on file.  Social History     Tobacco Use    Smoking status: Every Day     Types: Pipe    Smokeless tobacco: Never   Substance Use Topics    Alcohol use: Yes    Drug use: Yes     Types: Marijuana, Benzodiazepines, Fentanyl, Heroin, IV     Comment: heorine     Review of Systems   Constitutional:  Negative for fever.   HENT:  Negative for sore throat.    Respiratory:  Negative for shortness of breath.    Cardiovascular:  Negative for chest pain.   Gastrointestinal:  Negative for nausea.   Genitourinary:  Negative for dysuria.   Musculoskeletal:  Negative for back pain.   Skin:  Negative for rash.   Neurological:  Negative for weakness.   Hematological:  Does not bruise/bleed easily.   Psychiatric/Behavioral:  Positive for sleep disturbance. Negative for self-injury and suicidal ideas. The patient is nervous/anxious.      Physical Exam     Initial Vitals   BP Pulse Resp Temp SpO2   -- -- --  -- --      MAP       --         Physical Exam    Nursing note and vitals reviewed.  Constitutional:   Narrative: Triage vital signs reviewed.     Constitutional: Well-nourished, well-developed. Not in acute distress. Slightly manic but cooperative. Unkempt.      HEENT: Normocephalic, atraumatic. Moist mucous membranes.     Eyes: No conjunctival injection.     Resp: Normal respiratory effort, breathing unlabored.     Cardio: Regular rate and rhythm.     GI: Abdomen non-distended.      MSK: Extremities without any deformities noted. No lower extremity edema.     Skin: Warm and dry. No rashes or lesions noted.     Neuro: Awake and alert. Moves all extremities.             ED Course   Procedures  Labs Reviewed - No data to display       Imaging Results    None          Medications - No data to display  Medical Decision Making:   ED Management:  Patient is very pleasant and is answering questions appropriately.  No clinical signs of intoxication. Slightly manic but no indication to PEC. Patient states that he drank 5 twisted teas and use heroin today, not in an effort to hurt himself, but he wanted to take a nap.  [place] [employer]  Patient denies any somatic complaints and does not feel like he needs to be in the hospital.  Will discharge with Narcan prescription, and resources for detox.                         Clinical Impression:   Final diagnoses:  [Z13.9] Encounter for medical screening examination (Primary)      ED Disposition Condition    Discharge Stable          ED Prescriptions       Medication Sig Dispense Start Date End Date Auth. Provider    naloxone (NARCAN) 1 mg/mL injection 2 mg (1 mg per nostril) by Nasal route as needed for opioid overdose; may repeat in 3 to 5 minutes if not effective. Call 911 2 mL 12/9/2022 -- Christy Meyer MD          Follow-up Information    None          Christy Meyer MD  12/09/22 2426

## 2023-04-05 ENCOUNTER — HOSPITAL ENCOUNTER (EMERGENCY)
Facility: HOSPITAL | Age: 28
Discharge: HOME | End: 2023-04-05
Attending: STUDENT IN AN ORGANIZED HEALTH CARE EDUCATION/TRAINING PROGRAM
Payer: MEDICAID

## 2023-04-05 VITALS
SYSTOLIC BLOOD PRESSURE: 101 MMHG | OXYGEN SATURATION: 95 % | DIASTOLIC BLOOD PRESSURE: 58 MMHG | RESPIRATION RATE: 18 BRPM | TEMPERATURE: 98 F | HEART RATE: 74 BPM

## 2023-04-05 DIAGNOSIS — R06.02 SOB (SHORTNESS OF BREATH): ICD-10-CM

## 2023-04-05 PROCEDURE — 93005 ELECTROCARDIOGRAM TRACING: CPT

## 2023-04-05 PROCEDURE — 99284 PR EMERGENCY DEPT VISIT,LEVEL IV: ICD-10-PCS | Mod: ,,, | Performed by: STUDENT IN AN ORGANIZED HEALTH CARE EDUCATION/TRAINING PROGRAM

## 2023-04-05 PROCEDURE — 99284 EMERGENCY DEPT VISIT MOD MDM: CPT | Mod: 25

## 2023-04-05 PROCEDURE — 93010 EKG 12-LEAD: ICD-10-PCS | Mod: ,,, | Performed by: INTERNAL MEDICINE

## 2023-04-05 PROCEDURE — 99284 EMERGENCY DEPT VISIT MOD MDM: CPT | Mod: ,,, | Performed by: STUDENT IN AN ORGANIZED HEALTH CARE EDUCATION/TRAINING PROGRAM

## 2023-04-05 PROCEDURE — 93010 ELECTROCARDIOGRAM REPORT: CPT | Mod: ,,, | Performed by: INTERNAL MEDICINE

## 2023-04-05 NOTE — ED PROVIDER NOTES
Encounter Date: 4/5/2023       History     Chief Complaint   Patient presents with    Shortness of Breath     Pt was at [facility] for opiate detox. Staff reports patient was having SOB. Pt denies any symptoms. Pt doesn't want to be here.      27-year-old male who was at [facility] for alcohol and opioid detoxification presents here for reported shortness a breath.  Patient says that he was feeling particularly anxious while he was there and was short of breath the time.  He denies any symptoms now.  He feels well and would like to go back to [facility].  He says that he would like a sandwich and some food.  No chest pain.    Review of patient's allergies indicates:  No Known Allergies  Past Medical History:   Diagnosis Date    ADHD (attention deficit hyperactivity disorder)     Anxiety     Anxiety     Benzodiazepine abuse     Bipolar disorder     Depression     History of psychiatric hospitalization     Hx of psychiatric care     Panic attack     Psychiatric problem     Serotonin syndrome     Substance abuse     Suicide attempt      History reviewed. No pertinent surgical history.  No family history on file.  Social History     Tobacco Use    Smoking status: Every Day     Types: Pipe    Smokeless tobacco: Never   Substance Use Topics    Alcohol use: Yes    Drug use: Yes     Types: Marijuana, Benzodiazepines, Fentanyl, Heroin, IV     Comment: fransisco     Review of Systems   All other systems reviewed and are negative.    Physical Exam     Initial Vitals [04/05/23 1332]   BP Pulse Resp Temp SpO2   132/86 96 18 98.3 °F (36.8 °C) 98 %      MAP       --         Physical Exam    Nursing note and vitals reviewed.  Constitutional:   Unkempt, nad   HENT:   Head: Normocephalic and atraumatic.   Eyes: EOM are normal. Pupils are equal, round, and reactive to light.   Neck: Neck supple. No JVD present.   Normal range of motion.  Cardiovascular:  Normal rate, regular rhythm, normal heart sounds and intact distal pulses.     Exam  reveals no gallop and no friction rub.       No murmur heard.  Pulmonary/Chest: Breath sounds normal. No stridor. No respiratory distress. He has no wheezes. He has no rhonchi. He has no rales. He exhibits no tenderness.   Abdominal: Abdomen is soft. There is no abdominal tenderness.   Musculoskeletal:         General: No edema. Normal range of motion.      Cervical back: Normal range of motion and neck supple.     Neurological: He is alert. GCS score is 15. GCS eye subscore is 4. GCS verbal subscore is 5. GCS motor subscore is 6.   Skin: Skin is warm. Capillary refill takes less than 2 seconds.   Psychiatric: He has a normal mood and affect. Thought content normal.       ED Course   Procedures  Labs Reviewed   HIV 1 / 2 ANTIBODY   HEPATITIS C ANTIBODY          Imaging Results              X-Ray Chest AP Portable (Final result)  Result time 04/05/23 15:55:07      Final result by Shamir Earl MD (04/05/23 15:55:07)                   Impression:      No radiographic evidence of pneumonia or other source of shortness of breath on this single view, noting that early/mild viral pneumonia may be radiographically occult.      Electronically signed by: Shamir Earl MD  Date:    04/05/2023  Time:    15:55               Narrative:    EXAMINATION:  XR CHEST AP PORTABLE    CLINICAL HISTORY:  Shortness of breath    TECHNIQUE:  Single frontal view of the chest was performed.    COMPARISON:  Chest radiograph 10/21/2017    FINDINGS:  Patient is rotated.  No detrimental change.The lungs are normal to slightly hypoexpanded but grossly clear, with normal appearance of pulmonary vasculature and no pleural effusion or pneumothorax.    The cardiac silhouette is normal in size. The hilar and mediastinal contours are within normal limits.    Bones are intact.                                       Medications - No data to display  Medical Decision Making:   Initial Assessment:   Hemodynamically stable. Afebrile. Phonating and protecting  the airway spontaneously. No clinical evidence for cardiovascular instability or impending airway compromise. Examination as above.  Patient asymptomatic at this time.  Will provide nourishment.  Will obtain EKG and chest x-ray.  If normal will discharge back to [facility].           ED Course as of 04/05/23 1600   Wed Apr 05, 2023   1509 Twelve lead EKG per my independent interpretation reveals normal sinus rhythm at a rate of 82.  Normal axis, normal intervals.  No regional ST segment elevation. [BG]   1550 Chest x-ray reviewed.  Asymptomatic. The patient was reassessed and on subsequent re-evaluation, they were subjectively feeling better. They were resting comfortably and in no acute distress. I discussed the laboratory and diagnostic findings with the patient. Education was provided and all questions were answered. As discussed, they were recommended to follow up with their primary care physician within the next few days and to return to the emergency department sooner for any new or worsening. They acknowledged and verbalized agreement to the treatment plan. The patient was discharged home in stable condition.     DISCLAIMER: This note was prepared with [employer] voice recognition transcription software. Garbled syntax, mangled pronouns, and other bizarre constructions may be attributed to that software system.     [BG]      ED Course User Index  [BG] Jarvis Salas MD                 Clinical Impression:   Final diagnoses:  [R06.02] SOB (shortness of breath)        ED Disposition Condition    Discharge Stable          ED Prescriptions    None       Follow-up Information       Follow up With Specialties Details Why Contact Info    Den Perez MD Family Medicine Go to  As needed [address] [address]  [phone]               Jarvis Salas MD  04/05/23 1600

## 2023-04-05 NOTE — ED TRIAGE NOTES
Pt from [facility] for opiate detox. Pt reports he had a panic attack, hx of anxiety and depression. Denies SI    Patient identifiers for Robert Batres 27 y.o. male checked and correct.  Chief Complaint   Patient presents with    Shortness of Breath     Pt was at [facility] for opiate detox. Staff reports patient was having SOB. Pt denies any symptoms. Pt doesn't want to be here.      Past Medical History:   Diagnosis Date    ADHD (attention deficit hyperactivity disorder)     Anxiety     Anxiety     Benzodiazepine abuse     Bipolar disorder     Depression     History of psychiatric hospitalization     Hx of psychiatric care     Panic attack     Psychiatric problem     Serotonin syndrome     Substance abuse     Suicide attempt      Allergies reported: Review of patient's allergies indicates:  No Known Allergies      LOC: Patient is awake, alert, and aware of environment with an appropriate affect. Patient is oriented x 4 and speaking appropriately.  APPEARANCE: Patient resting comfortably and in no acute distress. Patient unkempt, patient's clothing is properly fastened.  SKIN: The skin is warm and dry. Patient has normal skin turgor and moist mucus membranes.   MUSKULOSKELETAL: Patient is moving all extremities well, no obvious deformities noted. Pulses intact.   RESPIRATORY: Airway is open and patent. Respirations are spontaneous and non-labored with normal effort and rate.  CARDIAC: Patient has a normal rate and rhythm. on cardiac monitor. No peripheral edema noted.   ABDOMEN: No distention noted. Soft and non-tender upon palpation.  NEUROLOGICAL: PERRL. Facial expression is symmetrical. Hand grasps are equal bilaterally. Normal sensation in all extremities when touched with finger.

## 2023-04-05 NOTE — DISCHARGE INSTRUCTIONS

## 2023-04-22 ENCOUNTER — HOSPITAL ENCOUNTER (EMERGENCY)
Facility: HOSPITAL | Age: 28
Discharge: HOME | End: 2023-04-22
Payer: MEDICAID

## 2023-04-22 VITALS
BODY MASS INDEX: 23.33 KG/M2 | OXYGEN SATURATION: 99 % | SYSTOLIC BLOOD PRESSURE: 122 MMHG | HEART RATE: 85 BPM | HEIGHT: 73 IN | RESPIRATION RATE: 18 BRPM | DIASTOLIC BLOOD PRESSURE: 89 MMHG | TEMPERATURE: 98 F | WEIGHT: 176 LBS

## 2023-04-22 DIAGNOSIS — F41.9 ANXIETY: Primary | ICD-10-CM

## 2023-04-22 PROBLEM — F39 MOOD DISORDER: Status: ACTIVE | Noted: 2023-04-22

## 2023-04-22 LAB
HCV AB SERPL QL IA: REACTIVE
HIV 1+2 AB+HIV1 P24 AG SERPL QL IA: NORMAL

## 2023-04-22 PROCEDURE — 25000003 PHARM REV CODE 250: Performed by: NURSE PRACTITIONER

## 2023-04-22 PROCEDURE — 99283 EMERGENCY DEPT VISIT LOW MDM: CPT

## 2023-04-22 PROCEDURE — 86803 HEPATITIS C AB TEST: CPT | Performed by: EMERGENCY MEDICINE

## 2023-04-22 PROCEDURE — 87389 HIV-1 AG W/HIV-1&-2 AB AG IA: CPT | Performed by: EMERGENCY MEDICINE

## 2023-04-22 RX ORDER — HYDROXYZINE PAMOATE 25 MG/1
50 CAPSULE ORAL
Status: COMPLETED | OUTPATIENT
Start: 2023-04-22 | End: 2023-04-22

## 2023-04-22 RX ADMIN — HYDROXYZINE PAMOATE 50 MG: 25 CAPSULE ORAL at 03:04

## 2023-04-22 NOTE — ED PROVIDER NOTES
Encounter Date: 4/22/2023       History     Chief Complaint   Patient presents with    Anxiety     Restless anxious right shoulder pain dizzy     Presents to ED with complaints of anxiety. States feels restless. Later said they took his BP at home and it was high so they decided to come to the ED.     Review of patient's allergies indicates:   Allergen Reactions    Sulfa (sulfonamide antibiotics) Rash     Past Medical History:   Diagnosis Date    ADHD (attention deficit hyperactivity disorder)     Anxiety     Anxiety     Benzodiazepine abuse     Bipolar disorder     Depression     History of psychiatric hospitalization     Hx of psychiatric care     Panic attack     Psychiatric problem     Serotonin syndrome     Substance abuse     Suicide attempt      History reviewed. No pertinent surgical history.  History reviewed. No pertinent family history.  Social History     Tobacco Use    Smoking status: Every Day     Types: Pipe    Smokeless tobacco: Never   Substance Use Topics    Alcohol use: Yes    Drug use: Yes     Types: Marijuana, Benzodiazepines, Fentanyl, Heroin, IV     Comment: heorine     Review of Systems   Constitutional:  Negative for fever.   HENT:  Negative for sore throat.    Respiratory:  Negative for shortness of breath.    Cardiovascular:  Negative for chest pain.   Gastrointestinal:  Negative for nausea.   Genitourinary:  Negative for dysuria.   Musculoskeletal:  Negative for back pain.   Skin:  Negative for rash.   Neurological:  Negative for weakness.   Hematological:  Does not bruise/bleed easily.   Psychiatric/Behavioral:  The patient is nervous/anxious.      Physical Exam     Initial Vitals [04/22/23 1405]   BP Pulse Resp Temp SpO2   (!) 142/103 (!) 111 20 98.1 °F (36.7 °C) 99 %      MAP       --         Physical Exam    Nursing note and vitals reviewed.  Constitutional: He appears well-developed and well-nourished.   HENT:   Head: Normocephalic and atraumatic.   Eyes: EOM are normal.   Neck: Neck  supple.   Normal range of motion.  Cardiovascular:  Normal rate and regular rhythm.           Pulmonary/Chest: Breath sounds normal. He has no wheezes. He has no rhonchi.   Musculoskeletal:         General: Normal range of motion.      Cervical back: Normal range of motion and neck supple.     Lymphadenopathy:     He has no cervical adenopathy.   Neurological: He is alert and oriented to person, place, and time.   Skin: Skin is warm and dry. Capillary refill takes less than 2 seconds.   Psychiatric: He has a normal mood and affect. Thought content normal.       ED Course   Procedures  Labs Reviewed   HIV 1 / 2 ANTIBODY   HEPATITIS C ANTIBODY          Imaging Results    None          Medications   hydrOXYzine pamoate capsule 50 mg (50 mg Oral Given 4/22/23 1526)                              Clinical Impression:   Final diagnoses:  [F41.9] Anxiety (Primary)        ED Disposition Condition    Discharge Good          ED Prescriptions    None       Follow-up Information       Follow up With Specialties Details Why Contact Info    Den Perez MD Family Medicine  Schedule an appointment [address] [place] [address]  [phone]               BOBBY Catalan  04/22/23 1616       BOBBY Catalan  04/22/23 1616

## 2023-04-24 ENCOUNTER — TELEPHONE (OUTPATIENT)
Dept: EMERGENCY MEDICINE | Facility: HOSPITAL | Age: 28
End: 2023-04-24
Payer: MEDICAID

## 2023-04-24 DIAGNOSIS — R76.8 HEPATITIS C ANTIBODY POSITIVE IN BLOOD: Primary | ICD-10-CM

## 2023-06-10 PROCEDURE — 99283 EMERGENCY DEPT VISIT LOW MDM: CPT

## 2023-06-10 PROCEDURE — 99284 PR EMERGENCY DEPT VISIT,LEVEL IV: ICD-10-PCS | Mod: ,,, | Performed by: EMERGENCY MEDICINE

## 2023-06-10 PROCEDURE — 99284 EMERGENCY DEPT VISIT MOD MDM: CPT | Mod: ,,, | Performed by: EMERGENCY MEDICINE

## 2023-06-11 ENCOUNTER — HOSPITAL ENCOUNTER (EMERGENCY)
Facility: HOSPITAL | Age: 28
Discharge: PSYCHIATRIC OR SUBSTANCE USE FACILITY | End: 2023-06-11
Attending: EMERGENCY MEDICINE
Payer: MEDICAID

## 2023-06-11 VITALS
DIASTOLIC BLOOD PRESSURE: 61 MMHG | HEART RATE: 84 BPM | SYSTOLIC BLOOD PRESSURE: 108 MMHG | RESPIRATION RATE: 12 BRPM | OXYGEN SATURATION: 96 % | TEMPERATURE: 99 F

## 2023-06-11 DIAGNOSIS — F19.920 DRUG INTOXICATION WITHOUT COMPLICATION: Primary | ICD-10-CM

## 2023-06-11 LAB
ALBUMIN SERPL BCP-MCNC: 3.3 G/DL (ref 3.5–5.2)
ALP SERPL-CCNC: 111 U/L (ref 55–135)
ALT SERPL W/O P-5'-P-CCNC: 17 U/L (ref 10–44)
ANION GAP SERPL CALC-SCNC: 10 MMOL/L (ref 8–16)
AST SERPL-CCNC: 15 U/L (ref 10–40)
BASOPHILS # BLD AUTO: 0.03 K/UL (ref 0–0.2)
BASOPHILS NFR BLD: 0.4 % (ref 0–1.9)
BILIRUB SERPL-MCNC: 0.2 MG/DL (ref 0.1–1)
BUN SERPL-MCNC: 8 MG/DL (ref 6–20)
CALCIUM SERPL-MCNC: 9.3 MG/DL (ref 8.7–10.5)
CHLORIDE SERPL-SCNC: 105 MMOL/L (ref 95–110)
CO2 SERPL-SCNC: 26 MMOL/L (ref 23–29)
CREAT SERPL-MCNC: 0.9 MG/DL (ref 0.5–1.4)
DIFFERENTIAL METHOD: ABNORMAL
EOSINOPHIL # BLD AUTO: 0.1 K/UL (ref 0–0.5)
EOSINOPHIL NFR BLD: 1 % (ref 0–8)
ERYTHROCYTE [DISTWIDTH] IN BLOOD BY AUTOMATED COUNT: 12.2 % (ref 11.5–14.5)
EST. GFR  (NO RACE VARIABLE): >60 ML/MIN/1.73 M^2
ETHANOL SERPL-MCNC: <10 MG/DL
GLUCOSE SERPL-MCNC: 131 MG/DL (ref 70–110)
HCT VFR BLD AUTO: 35.7 % (ref 40–54)
HGB BLD-MCNC: 12.4 G/DL (ref 14–18)
IMM GRANULOCYTES # BLD AUTO: 0.02 K/UL (ref 0–0.04)
IMM GRANULOCYTES NFR BLD AUTO: 0.3 % (ref 0–0.5)
LYMPHOCYTES # BLD AUTO: 1.8 K/UL (ref 1–4.8)
LYMPHOCYTES NFR BLD: 25.6 % (ref 18–48)
MCH RBC QN AUTO: 32.1 PG (ref 27–31)
MCHC RBC AUTO-ENTMCNC: 34.7 G/DL (ref 32–36)
MCV RBC AUTO: 93 FL (ref 82–98)
MONOCYTES # BLD AUTO: 0.4 K/UL (ref 0.3–1)
MONOCYTES NFR BLD: 6.3 % (ref 4–15)
NEUTROPHILS # BLD AUTO: 4.6 K/UL (ref 1.8–7.7)
NEUTROPHILS NFR BLD: 66.4 % (ref 38–73)
NRBC BLD-RTO: 0 /100 WBC
PLATELET # BLD AUTO: 341 K/UL (ref 150–450)
PMV BLD AUTO: 9.7 FL (ref 9.2–12.9)
POTASSIUM SERPL-SCNC: 3.9 MMOL/L (ref 3.5–5.1)
PROT SERPL-MCNC: 6.3 G/DL (ref 6–8.4)
RBC # BLD AUTO: 3.86 M/UL (ref 4.6–6.2)
SODIUM SERPL-SCNC: 141 MMOL/L (ref 136–145)
WBC # BLD AUTO: 6.88 K/UL (ref 3.9–12.7)

## 2023-06-11 PROCEDURE — 82077 ASSAY SPEC XCP UR&BREATH IA: CPT | Performed by: EMERGENCY MEDICINE

## 2023-06-11 PROCEDURE — 80053 COMPREHEN METABOLIC PANEL: CPT | Performed by: EMERGENCY MEDICINE

## 2023-06-11 PROCEDURE — 85025 COMPLETE CBC W/AUTO DIFF WBC: CPT | Performed by: EMERGENCY MEDICINE

## 2023-06-11 RX ORDER — NALOXONE HYDROCHLORIDE 4 MG/.1ML
SPRAY NASAL
Qty: 1 EACH | Refills: 0 | OUTPATIENT
Start: 2023-06-11 | End: 2023-07-01

## 2023-06-11 NOTE — ED NOTES
Robert Batres, a 28 y.o. male presents to the ED w/ complaint of heroin and alcohol overdose. States last use was yesterday. Pt drowsy but awakens to loud verbal stimuli.     Triage note:  Chief Complaint   Patient presents with    Medical Clearance     Patient attempting to admit himself to [facility] for heroin & alcohol detox. Last use 2 hours ago. [facility] sent pt here for medical clearance before admitting.      Review of patient's allergies indicates:   Allergen Reactions    Sulfa (sulfonamide antibiotics) Rash     Past Medical History:   Diagnosis Date    ADHD (attention deficit hyperactivity disorder)     Anxiety     Anxiety     Benzodiazepine abuse     Bipolar disorder     Depression     History of psychiatric hospitalization     Hx of psychiatric care     Panic attack     Psychiatric problem     Serotonin syndrome     Substance abuse     Suicide attempt     Patient identifiers for Robert Batres checked and correct.    LOC: The patient is drowsy, responds to voice, aware of environment, the patient is oriented x 4, slurred speech.  APPEARANCE: Patient resting comfortably and in no acute distress, patient smells, patient's clothing is properly fastened.  SKIN: The skin is warm and dry, color consistent with ethnicity, patient has normal skin turgor and moist mucus membranes, skin intact,   MUSCULOSKELETAL: Patient moving all extremities well, no obvious swelling or deformities noted.  RESPIRATORY: Airway is open and patent, respirations are spontaneous and even, patient has a normal effort and rate.  CARDIAC: Patient has a normal rate and rhythm, no periphreal edema noted, capillary refill < 3 seconds. Normal +2 pedal pulses present.  ABDOMEN: Soft and non tender to palpation, no distention noted. Patient denies any nausea, vomiting, diarrhea, or constipation.   NEUROLOGIC: Eyes open spontaneously, pinpoint pupils, behavior appropriate to situation, follows commands, facial expression symmetrical,  bilateral hand grasp equal and even, purposeful motor response noted, normal sensation in all extremities.

## 2023-06-11 NOTE — ED PROVIDER NOTES
Encounter Date: 6/10/2023       History     Chief Complaint   Patient presents with    Medical Clearance     Patient attempting to admit himself to [facility] for heroin & alcohol detox. Last use 2 hours ago. [facility] sent pt here for medical clearance before admitting.      HPI  28-year-old male with past medical history as noted below, multiple visits for anxiety, heroin abuse, coming in from [facility] for medical clearance.  Per report he went to check himself in but he was intoxicated/[facility] had positive opiates on his UDS so they sent him to [facility] for clearance.    Patient is minimally participating in exam wakes up to verbal and physical stimuli but then goes back to sleep mumbles a few words.  He does state that he is no complaints or pain.  Denies trauma.  States he drank alcohol and use opiates.  Does not provide further history.    Review of patient's allergies indicates:   Allergen Reactions    Sulfa (sulfonamide antibiotics) Rash     Past Medical History:   Diagnosis Date    ADHD (attention deficit hyperactivity disorder)     Anxiety     Anxiety     Benzodiazepine abuse     Bipolar disorder     Depression     History of psychiatric hospitalization     Hx of psychiatric care     Panic attack     Psychiatric problem     Serotonin syndrome     Substance abuse     Suicide attempt      History reviewed. No pertinent surgical history.  History reviewed. No pertinent family history.  Social History     Tobacco Use    Smoking status: Every Day     Types: Pipe    Smokeless tobacco: Never   Substance Use Topics    Alcohol use: Yes    Drug use: Yes     Types: Marijuana, Benzodiazepines, Fentanyl, Heroin, IV     Comment: fransisco     Review of Systems    Physical Exam     Initial Vitals [06/10/23 2336]   BP Pulse Resp Temp SpO2   (!) 106/54 92 16 98.4 °F (36.9 °C) 96 %      MAP       --         Physical Exam    Physical Exam:  CONSTITUTIONAL: Well developed, well nourished, in no acute distress.  Disheveled  HENT:  Normocephalic, atraumatic    EYES: Sclerae anicteric, pupils midsize, round reactive, equal.  NECK: Supple, no thyroid enlargement  CARDIOVASCULAR: Regular rate and rhythm without any murmurs, gallops, rubs.  RESPIRATORY: Breathing comfortably. Auscultation of the lungs revealed normal breath sounds b/l, no wheezing, no rales, no rhonchi.  ABDOMEN: Soft and nontender, no masses, no rebound or guarding   NEUROLOGIC:  Sleeping but arouses to loud verbal stimuli or physical touch.  Follows basic commands.  No facial droop.  Mild slurred speech moving all extremities.   MSK: Moving all four extremities.  Skin: Warm and dry. No visible rash on exposed areas of skin.    Psych:  Flat affect      ED Course   Procedures  Labs Reviewed   CBC W/ AUTO DIFFERENTIAL - Abnormal; Notable for the following components:       Result Value    RBC 3.86 (*)     Hemoglobin 12.4 (*)     Hematocrit 35.7 (*)     MCH 32.1 (*)     All other components within normal limits   COMPREHENSIVE METABOLIC PANEL - Abnormal; Notable for the following components:    Glucose 131 (*)     Albumin 3.3 (*)     All other components within normal limits   ALCOHOL,MEDICAL (ETHANOL)          Imaging Results    None          Medications - No data to display  Medical Decision Making:   Clinical Tests:   Lab Tests: Ordered and Reviewed     28-year-old male sent in for patient/altered mental status after checking in at [facility].  Patient endorses alcohol and opiates.  Minimally participating exam he does appear intoxicated.  No signs of trauma.  Following basic commands.    Will placed on monitor, end-tidal CO2, basic labs look for any metabolic hematologic abnormalities, alcohol level.  Will continue to monitor in the emergency department for clinical sobriety.    Update:  He remains hemodynamically stable  Labs are not remarkable however his alcohol level is 0.  I suspect that he is intoxicated with other non alcohol substances.  His U tox at [facility] was  positive for opiates and benzos.  This explains his toxidrome.    2:25 AM  Patient re-evaluated, he is still sleeping comfortably arouses easily to verbal stimuli.  Appears to be slightly less drowsy than before.  Will continue to monitor.  At this time there is no indication for brain CT as he is no signs of trauma and has no focal exam and is progressing towards clinical sobriety.    Update:  5 30 a.m.  Patient is arousable as able to get him up and he has walking around with a normal gait.  Clinically sober.  He does feel like he is starting to withdrawal from opiates.  I offered him Suboxone but he declined as he said it was too soon and he was concerned it would put him into further withdrawal.  Patient this time is medically clear.  He can be discharged back to [facility] to go there.    Findings of ED work up and return precautions verbally explained to patient. Patient agrees with discharge plan, return instructions and verbalizes understanding of return precautions.                          Clinical Impression:   Final diagnoses:  [F19.920] Drug intoxication without complication (Primary)        ED Disposition Condition    Discharge Stable          ED Prescriptions       Medication Sig Dispense Start Date End Date Auth. Provider    naloxone (NARCAN) 4 mg/actuation Spry 4mg by nasal route as needed for opioid overdose; may repeat every 2-3 minutes in alternating nostrils until medical help arrives. Call 911 1 each 6/11/2023 -- Jarvis Mendoza MD          Follow-up Information       Follow up With Specialties Details Why Contact Info    Den Perez MD Family Medicine   [address] [place] [address]  [phone]               Jarvis Mendoza MD  06/12/23 2946

## 2023-06-11 NOTE — DISCHARGE INSTRUCTIONS
Please follow-up with [facility] for further drug detox.    If you develop any new, worsening worrisome symptoms please return to the emergency department for re-evaluation.

## 2023-06-11 NOTE — ED NOTES
Bedside report with Ekta Porter RN.    Assumed care of pt at this time. VSS, RR even and unlabored. Resting in bed comfortably. No voiced compaints of pain or discomfort at this time. Safety protocols remain. Verified lines/leads attatched to patient at this time.      General: Asleep, rouses to voice.  Neck: Supple  Respiratory: Nonlabored respirations. No audible wheeze. Speaking in full sentences.  Cardiac: Well-perfused. No acrocyanosis.  Abdomen: Supple  Neurological: Very somnolent but rouses easily to voice. Speech slow.

## 2023-07-01 ENCOUNTER — HOSPITAL ENCOUNTER (EMERGENCY)
Facility: HOSPITAL | Age: 28
Discharge: HOME | End: 2023-07-01
Attending: EMERGENCY MEDICINE
Payer: MEDICAID

## 2023-07-01 VITALS
SYSTOLIC BLOOD PRESSURE: 115 MMHG | OXYGEN SATURATION: 96 % | DIASTOLIC BLOOD PRESSURE: 79 MMHG | RESPIRATION RATE: 18 BRPM | HEART RATE: 71 BPM | TEMPERATURE: 99 F

## 2023-07-01 DIAGNOSIS — T40.1X1A HEROIN OVERDOSE, ACCIDENTAL OR UNINTENTIONAL, INITIAL ENCOUNTER: Primary | ICD-10-CM

## 2023-07-01 PROCEDURE — 99283 EMERGENCY DEPT VISIT LOW MDM: CPT

## 2023-07-01 RX ORDER — NALOXONE HYDROCHLORIDE 4 MG/.1ML
SPRAY NASAL
Qty: 1 EACH | Refills: 11 | Status: SHIPPED | OUTPATIENT
Start: 2023-07-01

## 2023-07-01 NOTE — ED PROVIDER NOTES
Encounter Date: 7/1/2023       History     Chief Complaint   Patient presents with    Drug Overdose     Brought to ED by  [facility] after overdosing on heroin. Pt admits to snorting 1 line. Pt was given 0.5mg narcan IN. Pt is awake, alert, denies physical complaints. Requesting water.     Robert Batres is a 28 y.o. male who  has a past medical history of ADHD (attention deficit hyperactivity disorder), Anxiety, Anxiety, Benzodiazepine abuse, Bipolar disorder, Depression, History of psychiatric hospitalization, psychiatric care, Panic attack, Psychiatric problem, Serotonin syndrome, Substance abuse, and Suicide attempt.    The patient presents to the ED due to being found unconscious in a sitting position.  He was brought in by EMS who reports that he was lethargic prior to administration of 0.5 mg of Narcan intranasally.  Patient admits to snorting heroin earlier.  Denies any pain but reports he is thirsty.  He states he was not trying to hurt himself.  He denies any other concerns at this time chest pain abdominal pain vomiting or other symptoms.        Review of patient's allergies indicates:   Allergen Reactions    Sulfa (sulfonamide antibiotics) Rash     Past Medical History:   Diagnosis Date    ADHD (attention deficit hyperactivity disorder)     Anxiety     Anxiety     Benzodiazepine abuse     Bipolar disorder     Depression     History of psychiatric hospitalization     Hx of psychiatric care     Panic attack     Psychiatric problem     Serotonin syndrome     Substance abuse     Suicide attempt      No past surgical history on file.  No family history on file.  Social History     Tobacco Use    Smoking status: Every Day     Types: Pipe    Smokeless tobacco: Never   Substance Use Topics    Alcohol use: Yes    Drug use: Yes     Types: Marijuana, Benzodiazepines, Fentanyl, Heroin, IV     Comment: [email or web address]     Review of Systems   Constitutional:  Negative for fever.   HENT:  Negative for sore  throat.    Respiratory:  Negative for shortness of breath.    Cardiovascular:  Negative for chest pain.   Gastrointestinal:  Negative for nausea.   Genitourinary:  Negative for dysuria.   Musculoskeletal:  Negative for back pain.   Skin:  Negative for rash.   Neurological:  Negative for weakness.   Hematological:  Does not bruise/bleed easily.     Physical Exam     Initial Vitals [07/01/23 1138]   BP Pulse Resp Temp SpO2   110/60 84 18 98.5 °F (36.9 °C) 98 %      MAP       --         Physical Exam    Nursing note and vitals reviewed.  Constitutional: He appears well-developed and well-nourished. He is not diaphoretic. No distress.   HENT:   Head: Normocephalic and atraumatic.   Mouth/Throat: Oropharynx is clear and moist.   Eyes: EOM are normal. Pupils are equal, round, and reactive to light.   Neck: No tracheal deviation present.   Cardiovascular:  Normal rate, regular rhythm, normal heart sounds and intact distal pulses.           Pulmonary/Chest: Breath sounds normal. No stridor. No respiratory distress.   Abdominal: Abdomen is soft. He exhibits no distension and no mass. There is no abdominal tenderness.   Musculoskeletal:         General: No tenderness or edema. Normal range of motion.     Neurological: He is alert and oriented to person, place, and time. No cranial nerve deficit or sensory deficit.   Skin: Skin is warm and dry. Capillary refill takes less than 2 seconds. No rash noted.   Psychiatric: He has a normal mood and affect. His behavior is normal. Thought content normal.       ED Course   Procedures  Labs Reviewed - No data to display       Imaging Results    None          Medications - No data to display  Medical Decision Making:   Initial Assessment:   Pleasant 28-year-old man presenting today after accidental heroin overdose.  His vital signs are stable.  Will will plan to observe and reassess.  Differential Diagnosis:   Differential diagnosis includes but is not limited to:   Drug overdose,  respiratory failure, hypotension, trauma, sepsis  ED Management:  Patient was observed in the ED without acute event.  No distress noted.  He was given food and water.  His vital signs are stable no tachycardia or persistent hypoxia.  He is not required further administration of Narcan.  He is stable for discharge at this time.  Discussed return precautions for any new symptoms of concern.      After taking into careful account the historical factors and physical exam findings of the patient's presentation today, in conjunction with the empirical and objective data obtained on ED workup, no acute emergent medical condition has been identified. The patient appears to be low risk for an emergent medical condition and I feel it is safe and appropriate at this time for the patient to be discharged to follow-up as detailed in their discharge instructions for reevaluation and possible continued outpatient workup and management. I have discussed the specifics of the workup with the patient and the patient has verbalized understanding of the details of the workup, the diagnosis, the treatment plan, and the need for outpatient follow-up.  Although the patient has no emergent etiology today this does not preclude the development of an emergent condition so in addition, I have advised the patient that they can return to the ED and/or activate EMS at any time with worsening of their symptoms, change of their symptoms, or with any other medical complaint.  The patient remained comfortable and stable during their visit in the ED.  Discharge and follow-up instructions discussed with the patient who expressed understanding and willingness to comply with my recommendations.                          Clinical Impression:   Final diagnoses:  [T40.1X1A] Heroin overdose, accidental or unintentional, initial encounter (Primary)        ED Disposition Condition    Discharge Stable          ED Prescriptions       Medication Sig Dispense  Start Date End Date Auth. Provider    naloxone (NARCAN) 4 mg/actuation Spry 4mg by nasal route as needed for opioid overdose; may repeat every 2-3 minutes in alternating nostrils until medical help arrives. Call 911 1 each 7/1/2023 -- Asim Solis Jr., MD          Follow-up Information       Follow up With Specialties Details Why Contact Info    Den Perez MD Family Medicine In 3 days  [address] [address]  [phone]              Portions of this note were dictated using voice recognition software and may contain dictation related errors in spelling/grammar/syntax not found on text review       Asim Solis Jr., MD  07/01/23 5877

## 2023-07-01 NOTE — DISCHARGE INSTRUCTIONS

## 2023-07-01 NOTE — ED NOTES
Pt arrive via EMS complains of Drug Overdose. Pt admits to snorting 1 line. Pt was given 0.5mg narcan IN. Pt is awake, alert, denies physical complaints. Requesting water. Pt given water. Pt appears to be comfortable and in no distress.

## 2025-06-01 ENCOUNTER — HOSPITAL ENCOUNTER (EMERGENCY)
Facility: HOSPITAL | Age: 30
Discharge: HOME | End: 2025-06-01
Attending: EMERGENCY MEDICINE
Payer: MEDICAID

## 2025-06-01 VITALS
RESPIRATION RATE: 16 BRPM | DIASTOLIC BLOOD PRESSURE: 63 MMHG | HEART RATE: 96 BPM | OXYGEN SATURATION: 98 % | SYSTOLIC BLOOD PRESSURE: 116 MMHG | TEMPERATURE: 99 F

## 2025-06-01 DIAGNOSIS — S81.802A LEG WOUND, LEFT, INITIAL ENCOUNTER: ICD-10-CM

## 2025-06-01 DIAGNOSIS — E86.0 DEHYDRATION: Primary | ICD-10-CM

## 2025-06-01 LAB
HCV AB SERPL QL IA: REACTIVE
HIV 1+2 AB+HIV1 P24 AG SERPL QL IA: NORMAL

## 2025-06-01 PROCEDURE — 63600175 PHARM REV CODE 636 W HCPCS: Performed by: EMERGENCY MEDICINE

## 2025-06-01 PROCEDURE — 25000003 PHARM REV CODE 250: Performed by: EMERGENCY MEDICINE

## 2025-06-01 PROCEDURE — 87389 HIV-1 AG W/HIV-1&-2 AB AG IA: CPT | Performed by: PHYSICIAN ASSISTANT

## 2025-06-01 PROCEDURE — 80047 BASIC METABLC PNL IONIZED CA: CPT

## 2025-06-01 PROCEDURE — 99284 EMERGENCY DEPT VISIT MOD MDM: CPT | Mod: 25

## 2025-06-01 PROCEDURE — 87522 HEPATITIS C REVRS TRNSCRPJ: CPT | Performed by: PHYSICIAN ASSISTANT

## 2025-06-01 PROCEDURE — 87522 HEPATITIS C REVRS TRNSCRPJ: CPT | Performed by: EMERGENCY MEDICINE

## 2025-06-01 PROCEDURE — 96360 HYDRATION IV INFUSION INIT: CPT

## 2025-06-01 PROCEDURE — 86803 HEPATITIS C AB TEST: CPT | Performed by: PHYSICIAN ASSISTANT

## 2025-06-01 RX ORDER — MUPIROCIN 20 MG/G
1 OINTMENT TOPICAL
Status: COMPLETED | OUTPATIENT
Start: 2025-06-01 | End: 2025-06-01

## 2025-06-01 RX ADMIN — SODIUM CHLORIDE, POTASSIUM CHLORIDE, SODIUM LACTATE AND CALCIUM CHLORIDE 1000 ML: 600; 310; 30; 20 INJECTION, SOLUTION INTRAVENOUS at 10:06

## 2025-06-01 RX ADMIN — MUPIROCIN 1 TUBE: 20 OINTMENT TOPICAL at 11:06

## 2025-06-02 LAB
BUN SERPL-MCNC: 23 MG/DL (ref 6–30)
CHLORIDE SERPL-SCNC: 92 MMOL/L (ref 95–110)
CREAT SERPL-MCNC: 1.4 MG/DL (ref 0.5–1.4)
GLUCOSE SERPL-MCNC: 119 MG/DL (ref 70–110)
HCT VFR BLD CALC: 43 %PCV (ref 36–54)
HCV RNA SERPL NAA+PROBE-LOG IU: NOT DETECTED {LOG_IU}/ML
HCV RNA SERPL NAA+PROBE-LOG IU: NOT DETECTED {LOG_IU}/ML
HOLD SPECIMEN: NORMAL
POC IONIZED CALCIUM: 1.02 MMOL/L (ref 1.06–1.42)
POC TCO2 (MEASURED): 28 MMOL/L (ref 23–29)
POTASSIUM BLD-SCNC: 3.5 MMOL/L (ref 3.5–5.1)
SAMPLE: ABNORMAL
SODIUM BLD-SCNC: 131 MMOL/L (ref 136–145)